# Patient Record
Sex: FEMALE | Race: WHITE | NOT HISPANIC OR LATINO | Employment: FULL TIME | URBAN - METROPOLITAN AREA
[De-identification: names, ages, dates, MRNs, and addresses within clinical notes are randomized per-mention and may not be internally consistent; named-entity substitution may affect disease eponyms.]

---

## 2023-04-20 DIAGNOSIS — E11.9 TYPE 2 DIABETES MELLITUS WITHOUT COMPLICATION, UNSPECIFIED WHETHER LONG TERM INSULIN USE (MULTI): Primary | ICD-10-CM

## 2023-04-20 RX ORDER — BLOOD SUGAR DIAGNOSTIC
STRIP MISCELLANEOUS
Qty: 50 STRIP | Refills: 3 | Status: SHIPPED | OUTPATIENT
Start: 2023-04-20 | End: 2023-10-17

## 2023-04-28 ENCOUNTER — TELEPHONE (OUTPATIENT)
Dept: PRIMARY CARE | Facility: CLINIC | Age: 58
End: 2023-04-28
Payer: COMMERCIAL

## 2023-04-28 NOTE — TELEPHONE ENCOUNTER
Sole Moreno phoned, [phone]. She is requesting a refill on cyclobenzaprine, 5mg. Her pharmacy is [facility] in [place]./suresh

## 2023-05-02 ENCOUNTER — OFFICE VISIT (OUTPATIENT)
Dept: PRIMARY CARE | Facility: CLINIC | Age: 58
End: 2023-05-02
Payer: COMMERCIAL

## 2023-05-02 VITALS
WEIGHT: 264.5 LBS | RESPIRATION RATE: 16 BRPM | TEMPERATURE: 98.3 F | BODY MASS INDEX: 42.69 KG/M2 | SYSTOLIC BLOOD PRESSURE: 134 MMHG | OXYGEN SATURATION: 97 % | HEART RATE: 62 BPM | DIASTOLIC BLOOD PRESSURE: 77 MMHG

## 2023-05-02 DIAGNOSIS — F41.8 SITUATIONAL ANXIETY: ICD-10-CM

## 2023-05-02 DIAGNOSIS — M54.50 LOW BACK PAIN WITHOUT SCIATICA, UNSPECIFIED BACK PAIN LATERALITY, UNSPECIFIED CHRONICITY: ICD-10-CM

## 2023-05-02 DIAGNOSIS — I10 BENIGN ESSENTIAL HTN: ICD-10-CM

## 2023-05-02 DIAGNOSIS — E11.9 TYPE 2 DIABETES MELLITUS WITHOUT COMPLICATION, WITHOUT LONG-TERM CURRENT USE OF INSULIN (MULTI): Primary | ICD-10-CM

## 2023-05-02 DIAGNOSIS — E78.5 ELEVATED LIPIDS: ICD-10-CM

## 2023-05-02 DIAGNOSIS — J45.20 MILD INTERMITTENT ASTHMA, UNSPECIFIED WHETHER COMPLICATED (HHS-HCC): ICD-10-CM

## 2023-05-02 DIAGNOSIS — E61.1 IRON DEFICIENCY: ICD-10-CM

## 2023-05-02 DIAGNOSIS — F32.A DEPRESSIVE DISORDER: ICD-10-CM

## 2023-05-02 PROBLEM — J45.909 ASTHMA (HHS-HCC): Status: ACTIVE | Noted: 2023-05-02

## 2023-05-02 LAB
BASOPHILS (10*3/UL) IN BLOOD BY AUTOMATED COUNT: 0.04 X10E9/L (ref 0–0.1)
BASOPHILS/100 LEUKOCYTES IN BLOOD BY AUTOMATED COUNT: 0.7 % (ref 0–2)
EOSINOPHILS (10*3/UL) IN BLOOD BY AUTOMATED COUNT: 0.23 X10E9/L (ref 0–0.7)
EOSINOPHILS/100 LEUKOCYTES IN BLOOD BY AUTOMATED COUNT: 4 % (ref 0–6)
ERYTHROCYTE DISTRIBUTION WIDTH (RATIO) BY AUTOMATED COUNT: 15.3 % (ref 11.5–14.5)
ERYTHROCYTE MEAN CORPUSCULAR HEMOGLOBIN CONCENTRATION (G/DL) BY AUTOMATED: 31.4 G/DL (ref 32–36)
ERYTHROCYTE MEAN CORPUSCULAR VOLUME (FL) BY AUTOMATED COUNT: 82 FL (ref 80–100)
ERYTHROCYTES (10*6/UL) IN BLOOD BY AUTOMATED COUNT: 4.6 X10E12/L (ref 4–5.2)
HEMATOCRIT (%) IN BLOOD BY AUTOMATED COUNT: 37.9 % (ref 36–46)
HEMOGLOBIN (G/DL) IN BLOOD: 11.9 G/DL (ref 12–16)
IMMATURE GRANULOCYTES/100 LEUKOCYTES IN BLOOD BY AUTOMATED COUNT: 0.7 % (ref 0–0.9)
LEUKOCYTES (10*3/UL) IN BLOOD BY AUTOMATED COUNT: 5.8 X10E9/L (ref 4.4–11.3)
LYMPHOCYTES (10*3/UL) IN BLOOD BY AUTOMATED COUNT: 1.48 X10E9/L (ref 1.2–4.8)
LYMPHOCYTES/100 LEUKOCYTES IN BLOOD BY AUTOMATED COUNT: 25.5 % (ref 13–44)
MONOCYTES (10*3/UL) IN BLOOD BY AUTOMATED COUNT: 0.31 X10E9/L (ref 0.1–1)
MONOCYTES/100 LEUKOCYTES IN BLOOD BY AUTOMATED COUNT: 5.3 % (ref 2–10)
NEUTROPHILS (10*3/UL) IN BLOOD BY AUTOMATED COUNT: 3.71 X10E9/L (ref 1.2–7.7)
NEUTROPHILS/100 LEUKOCYTES IN BLOOD BY AUTOMATED COUNT: 63.8 % (ref 40–80)
NRBC (PER 100 WBCS) BY AUTOMATED COUNT: 0 /100 WBC (ref 0–0)
PLATELETS (10*3/UL) IN BLOOD AUTOMATED COUNT: 270 X10E9/L (ref 150–450)
POC HEMOGLOBIN A1C: 6.2 % (ref 4.2–6.5)

## 2023-05-02 PROCEDURE — 83550 IRON BINDING TEST: CPT

## 2023-05-02 PROCEDURE — 83036 HEMOGLOBIN GLYCOSYLATED A1C: CPT | Performed by: NURSE PRACTITIONER

## 2023-05-02 PROCEDURE — 80061 LIPID PANEL: CPT

## 2023-05-02 PROCEDURE — 3075F SYST BP GE 130 - 139MM HG: CPT | Performed by: NURSE PRACTITIONER

## 2023-05-02 PROCEDURE — 82728 ASSAY OF FERRITIN: CPT

## 2023-05-02 PROCEDURE — 83540 ASSAY OF IRON: CPT

## 2023-05-02 PROCEDURE — 1036F TOBACCO NON-USER: CPT | Performed by: NURSE PRACTITIONER

## 2023-05-02 PROCEDURE — 99215 OFFICE O/P EST HI 40 MIN: CPT | Performed by: NURSE PRACTITIONER

## 2023-05-02 PROCEDURE — 36415 COLL VENOUS BLD VENIPUNCTURE: CPT

## 2023-05-02 PROCEDURE — 80053 COMPREHEN METABOLIC PANEL: CPT

## 2023-05-02 PROCEDURE — 85025 COMPLETE CBC W/AUTO DIFF WBC: CPT

## 2023-05-02 PROCEDURE — 3078F DIAST BP <80 MM HG: CPT | Performed by: NURSE PRACTITIONER

## 2023-05-02 RX ORDER — GABAPENTIN 100 MG/1
100 CAPSULE ORAL AS NEEDED
COMMUNITY
Start: 2022-07-28 | End: 2023-05-02 | Stop reason: SDUPTHER

## 2023-05-02 RX ORDER — LISINOPRIL AND HYDROCHLOROTHIAZIDE 12.5; 2 MG/1; MG/1
2 TABLET ORAL DAILY
COMMUNITY
Start: 2021-01-28 | End: 2023-05-02 | Stop reason: SDUPTHER

## 2023-05-02 RX ORDER — SERTRALINE HYDROCHLORIDE 50 MG/1
50 TABLET, FILM COATED ORAL DAILY
Qty: 90 TABLET | Refills: 1 | Status: SHIPPED | OUTPATIENT
Start: 2023-05-02 | End: 2023-11-02

## 2023-05-02 RX ORDER — BLOOD SUGAR DIAGNOSTIC
STRIP MISCELLANEOUS
COMMUNITY
Start: 2022-01-10 | End: 2023-05-02 | Stop reason: SDUPTHER

## 2023-05-02 RX ORDER — SERTRALINE HYDROCHLORIDE 50 MG/1
50 TABLET, FILM COATED ORAL DAILY
COMMUNITY
End: 2023-05-02 | Stop reason: SDUPTHER

## 2023-05-02 RX ORDER — METFORMIN HYDROCHLORIDE 500 MG/1
500 TABLET ORAL
COMMUNITY
End: 2023-05-02 | Stop reason: SDUPTHER

## 2023-05-02 RX ORDER — ALBUTEROL SULFATE 90 UG/1
2 AEROSOL, METERED RESPIRATORY (INHALATION) EVERY 6 HOURS PRN
COMMUNITY
Start: 2014-01-08

## 2023-05-02 RX ORDER — CYCLOBENZAPRINE HCL 5 MG
5 TABLET ORAL 3 TIMES DAILY PRN
Qty: 30 TABLET | Refills: 0 | Status: SHIPPED | OUTPATIENT
Start: 2023-05-02 | End: 2023-08-17 | Stop reason: SDUPTHER

## 2023-05-02 RX ORDER — CYCLOBENZAPRINE HCL 5 MG
5 TABLET ORAL 3 TIMES DAILY PRN
COMMUNITY
Start: 2022-12-16 | End: 2023-05-02 | Stop reason: SDUPTHER

## 2023-05-02 RX ORDER — GABAPENTIN 100 MG/1
CAPSULE ORAL
Qty: 30 CAPSULE | Refills: 1 | Status: SHIPPED | OUTPATIENT
Start: 2023-05-02 | End: 2024-05-01 | Stop reason: SDUPTHER

## 2023-05-02 RX ORDER — METFORMIN HYDROCHLORIDE 500 MG/1
500 TABLET ORAL
Qty: 180 TABLET | Refills: 1 | Status: SHIPPED | OUTPATIENT
Start: 2023-05-02 | End: 2023-12-18

## 2023-05-02 RX ORDER — LISINOPRIL AND HYDROCHLOROTHIAZIDE 12.5; 2 MG/1; MG/1
2 TABLET ORAL DAILY
Qty: 180 TABLET | Refills: 1 | Status: SHIPPED | OUTPATIENT
Start: 2023-05-02 | End: 2024-05-01 | Stop reason: SDUPTHER

## 2023-05-02 ASSESSMENT — ENCOUNTER SYMPTOMS
FEVER: 0
POLYPHAGIA: 0
ADENOPATHY: 0
DIARRHEA: 0
ACTIVITY CHANGE: 0
JOINT SWELLING: 0
STRIDOR: 0
BRUISES/BLEEDS EASILY: 0
VOMITING: 0
DIAPHORESIS: 0
BACK PAIN: 1
UNEXPECTED WEIGHT CHANGE: 0
ANAL BLEEDING: 0
NAUSEA: 0
CHEST TIGHTNESS: 0
APNEA: 0
CHOKING: 0
HEADACHES: 0
POLYDIPSIA: 0
DYSPHORIC MOOD: 0
PHOTOPHOBIA: 0
NECK PAIN: 0
DIZZINESS: 0
COUGH: 0
WHEEZING: 0
SLEEP DISTURBANCE: 0
BLOOD IN STOOL: 0
NERVOUS/ANXIOUS: 0
SHORTNESS OF BREATH: 0
HEMATURIA: 0
CHILLS: 0
WEAKNESS: 0
APPETITE CHANGE: 0
ABDOMINAL PAIN: 0
FATIGUE: 0
PALPITATIONS: 0

## 2023-05-02 NOTE — PROGRESS NOTES
Subjective   Patient ID: Sole Moreno is a 58 y.o. female who presents for Diabetes and Back Pain (Cyclobenzeprine refill).    HPI   Patient in office for follow-up on hypertension (controlled), hypertension (controlled); elevated lipids (stable, per last lab check), depression (controlled), asthma (use rescue inhaler </monthly), and situational anxiety (controlled). She twisted her lower back last week and would like to have her Flexeril tab refilled. She understands not to take Gabapentin and Flexeril together. The patient had low iron levels at recent blood donation sites and could not donate. No hx of anemia. Not on supplements. Will check today. No other concerns.     A1C: 6.2%  BP: controlled    Patient declines statin therapy. Allergic to Aspirin.     Sees ophthalmologist regularly.     Monitors his feet for lesions.     Review of Systems   Constitutional:  Negative for activity change, appetite change, chills, diaphoresis, fatigue, fever and unexpected weight change.   HENT:  Negative for nosebleeds.    Eyes:  Negative for photophobia and visual disturbance.   Respiratory:  Negative for apnea, cough, choking, chest tightness, shortness of breath, wheezing and stridor.    Cardiovascular:  Negative for chest pain, palpitations and leg swelling.   Gastrointestinal:  Negative for abdominal pain, anal bleeding, blood in stool, diarrhea, nausea and vomiting.   Endocrine: Negative for cold intolerance, heat intolerance, polydipsia, polyphagia and polyuria.   Genitourinary:  Negative for hematuria.   Musculoskeletal:  Positive for back pain. Negative for gait problem, joint swelling and neck pain.   Skin:  Negative for pallor.   Neurological:  Negative for dizziness, syncope, weakness and headaches.   Hematological:  Negative for adenopathy. Does not bruise/bleed easily.   Psychiatric/Behavioral:  Negative for dysphoric mood and sleep disturbance. The patient is not nervous/anxious.        Objective   /77    Pulse 62   Temp 36.8 °C (98.3 °F) (Temporal)   Resp 16   Wt 120 kg (264 lb 8 oz)   SpO2 97%   BMI 42.69 kg/m²     Physical Exam  Constitutional:       Appearance: Normal appearance.   HENT:      Head: Normocephalic.   Eyes:      Conjunctiva/sclera: Conjunctivae normal.   Cardiovascular:      Rate and Rhythm: Normal rate and regular rhythm.      Pulses: Normal pulses.      Heart sounds: Normal heart sounds.   Pulmonary:      Effort: Pulmonary effort is normal.      Breath sounds: Normal breath sounds.   Musculoskeletal:         General: No tenderness.      Right lower leg: No edema.      Left lower leg: No edema.      Comments: Lumbar spinal ROM slightly decreased   Skin:     General: Skin is warm.      Coloration: Skin is not pale.      Findings: No bruising.   Neurological:      General: No focal deficit present.      Mental Status: She is alert.      Gait: Gait normal.   Psychiatric:         Mood and Affect: Mood normal.         Behavior: Behavior normal.         Thought Content: Thought content normal.         Judgment: Judgment normal.       Assessment/Plan     Exam findings reviewed with patient. Medications and labs reviewed. Risk and benefits of statin therapy reviewed. Patient verbalized understanding. Patient will keep monitoring blood pressures and blood sugars at home; she will call the office with outliers or abnormal trends. We will call with lab results and update the patient on the plan of care. Follow up in 6 months for physical or earlier as needed.    Benefits of healthy lifestyle reviewed: low carbohydrates/fat/sugar diet; use lean white instead of red meet; use several servings of fruit and vegetables daily; use whole grain flour instead of white flour products; cook at home frequently instead of eating out; exercise 30-90 minutes 5 x/week.

## 2023-05-03 LAB
ALANINE AMINOTRANSFERASE (SGPT) (U/L) IN SER/PLAS: 21 U/L (ref 7–45)
ALBUMIN (G/DL) IN SER/PLAS: 4.1 G/DL (ref 3.4–5)
ALKALINE PHOSPHATASE (U/L) IN SER/PLAS: 74 U/L (ref 33–136)
ANION GAP IN SER/PLAS: 13 MMOL/L (ref 10–20)
ASPARTATE AMINOTRANSFERASE (SGOT) (U/L) IN SER/PLAS: 14 U/L (ref 9–39)
BILIRUBIN TOTAL (MG/DL) IN SER/PLAS: 0.5 MG/DL (ref 0–1.2)
CALCIUM (MG/DL) IN SER/PLAS: 9.8 MG/DL (ref 8.6–10.6)
CARBON DIOXIDE, TOTAL (MMOL/L) IN SER/PLAS: 29 MMOL/L (ref 21–32)
CHLORIDE (MMOL/L) IN SER/PLAS: 103 MMOL/L (ref 98–107)
CHOLESTEROL (MG/DL) IN SER/PLAS: 171 MG/DL (ref 0–199)
CHOLESTEROL IN HDL (MG/DL) IN SER/PLAS: 47.5 MG/DL
CHOLESTEROL/HDL RATIO: 3.6
CREATININE (MG/DL) IN SER/PLAS: 0.69 MG/DL (ref 0.5–1.05)
FERRITIN (UG/LL) IN SER/PLAS: 33 UG/L (ref 8–150)
GFR FEMALE: >90 ML/MIN/1.73M2
GLUCOSE (MG/DL) IN SER/PLAS: 124 MG/DL (ref 74–99)
IRON (UG/DL) IN SER/PLAS: 52 UG/DL (ref 35–150)
IRON BINDING CAPACITY (UG/DL) IN SER/PLAS: 402 UG/DL (ref 240–445)
IRON SATURATION (%) IN SER/PLAS: 13 % (ref 25–45)
LDL: 85 MG/DL (ref 0–99)
POTASSIUM (MMOL/L) IN SER/PLAS: 3.5 MMOL/L (ref 3.5–5.3)
PROTEIN TOTAL: 6.3 G/DL (ref 6.4–8.2)
SODIUM (MMOL/L) IN SER/PLAS: 141 MMOL/L (ref 136–145)
TRIGLYCERIDE (MG/DL) IN SER/PLAS: 194 MG/DL (ref 0–149)
UREA NITROGEN (MG/DL) IN SER/PLAS: 15 MG/DL (ref 6–23)
VLDL: 39 MG/DL (ref 0–40)

## 2023-08-17 DIAGNOSIS — M54.50 LOW BACK PAIN WITHOUT SCIATICA, UNSPECIFIED BACK PAIN LATERALITY, UNSPECIFIED CHRONICITY: ICD-10-CM

## 2023-08-17 RX ORDER — CYCLOBENZAPRINE HCL 5 MG
5 TABLET ORAL 3 TIMES DAILY PRN
Qty: 30 TABLET | Refills: 0 | Status: SHIPPED | OUTPATIENT
Start: 2023-08-17 | End: 2023-10-04 | Stop reason: SDUPTHER

## 2023-08-17 NOTE — TELEPHONE ENCOUNTER
Sole Moreno phoned, [phone]. She has scheduled an appointment for August 23 with Madan. She has pain in her upper right thigh. She would like her muscle relaxant to be refilled.      Cyclovenzaprine   Dosage 5mg  As needed  Pharmacy: [facility] in [place]

## 2023-08-23 ENCOUNTER — OFFICE VISIT (OUTPATIENT)
Dept: PRIMARY CARE | Facility: CLINIC | Age: 58
End: 2023-08-23
Payer: COMMERCIAL

## 2023-08-23 VITALS
DIASTOLIC BLOOD PRESSURE: 71 MMHG | WEIGHT: 266 LBS | HEART RATE: 68 BPM | RESPIRATION RATE: 18 BRPM | OXYGEN SATURATION: 97 % | TEMPERATURE: 98.4 F | SYSTOLIC BLOOD PRESSURE: 124 MMHG | BODY MASS INDEX: 41.75 KG/M2 | HEIGHT: 67 IN

## 2023-08-23 DIAGNOSIS — M79.651 PAIN OF RIGHT THIGH: ICD-10-CM

## 2023-08-23 DIAGNOSIS — R20.0 THIGH NUMBNESS: ICD-10-CM

## 2023-08-23 DIAGNOSIS — R22.41 MASS OF THIGH, RIGHT: Primary | ICD-10-CM

## 2023-08-23 PROCEDURE — 3078F DIAST BP <80 MM HG: CPT | Performed by: NURSE PRACTITIONER

## 2023-08-23 PROCEDURE — 1036F TOBACCO NON-USER: CPT | Performed by: NURSE PRACTITIONER

## 2023-08-23 PROCEDURE — 3074F SYST BP LT 130 MM HG: CPT | Performed by: NURSE PRACTITIONER

## 2023-08-23 PROCEDURE — 99213 OFFICE O/P EST LOW 20 MIN: CPT | Performed by: NURSE PRACTITIONER

## 2023-08-23 ASSESSMENT — ENCOUNTER SYMPTOMS
CHOKING: 0
APNEA: 0
PALPITATIONS: 0
ADENOPATHY: 0
WHEEZING: 0
NUMBNESS: 1
COUGH: 0
BRUISES/BLEEDS EASILY: 0
CHILLS: 0
HEADACHES: 0
CHEST TIGHTNESS: 0
SHORTNESS OF BREATH: 0
UNEXPECTED WEIGHT CHANGE: 0
JOINT SWELLING: 0
NAUSEA: 0
WEAKNESS: 0
STRIDOR: 0
ABDOMINAL PAIN: 0
ROS SKIN COMMENTS: MASS
DIAPHORESIS: 0
ACTIVITY CHANGE: 0
DIZZINESS: 0
FATIGUE: 0
FEVER: 0
VOMITING: 0
APPETITE CHANGE: 0

## 2023-08-23 NOTE — PROGRESS NOTES
"Subjective   Patient ID: Sole Moreno is a 58 y.o. female who presents for right thigh problem.    HPI   Patient in office with concern of right thigh mass and recurrent short bouts of pain and numbness contained (non-radiating) in the lateral right thigh area x 10 years. She was diagnosed with a thigh mass by Dr. Lockett 10 years ago; imaging was negative; she was advised to follow up with a surgeon but never did. The symptoms are usually brought on by prolonged standing and are very short-lived. No symptoms currently. She has no other concerns today.     Review of Systems   Constitutional:  Negative for activity change, appetite change, chills, diaphoresis, fatigue, fever and unexpected weight change.   Respiratory:  Negative for apnea, cough, choking, chest tightness, shortness of breath, wheezing and stridor.    Cardiovascular:  Negative for chest pain, palpitations and leg swelling.   Gastrointestinal:  Negative for abdominal pain, nausea and vomiting.   Musculoskeletal:  Negative for gait problem and joint swelling.        Right thigh pain and mass   Skin:         mass   Neurological:  Positive for numbness. Negative for dizziness, syncope, weakness and headaches.   Hematological:  Negative for adenopathy. Does not bruise/bleed easily.     Objective   /71   Pulse 68   Temp 36.9 °C (98.4 °F)   Resp 18   Ht 1.702 m (5' 7\")   Wt 121 kg (266 lb)   SpO2 97%   BMI 41.66 kg/m²     Physical Exam  Constitutional:       Appearance: Normal appearance.   Cardiovascular:      Rate and Rhythm: Normal rate and regular rhythm.      Pulses: Normal pulses.      Heart sounds: Normal heart sounds.   Pulmonary:      Effort: Pulmonary effort is normal.      Breath sounds: Normal breath sounds.   Musculoskeletal:         General: No swelling, tenderness, deformity or signs of injury.      Right lower leg: No edema.      Left lower leg: No edema.   Skin:     General: Skin is warm.      Coloration: Skin is not jaundiced " or pale.      Findings: No bruising, erythema or rash.      Comments: 2 x 2 cm subcutaneous mass palpated in right lateral thigh area; non-tender; no signs of infection   Neurological:      General: No focal deficit present.      Mental Status: She is alert.      Motor: No weakness.      Gait: Gait normal.       Assessment/Plan     Exam findings reviewed with patient. Referral discussed. Follow up in 1 month for physical or earlier as needed.

## 2023-10-04 ENCOUNTER — TELEPHONE (OUTPATIENT)
Dept: PRIMARY CARE | Facility: CLINIC | Age: 58
End: 2023-10-04
Payer: COMMERCIAL

## 2023-10-04 DIAGNOSIS — M54.50 LOW BACK PAIN WITHOUT SCIATICA, UNSPECIFIED BACK PAIN LATERALITY, UNSPECIFIED CHRONICITY: ICD-10-CM

## 2023-10-04 RX ORDER — CYCLOBENZAPRINE HCL 5 MG
5 TABLET ORAL 3 TIMES DAILY PRN
Qty: 30 TABLET | Refills: 0 | Status: SHIPPED | OUTPATIENT
Start: 2023-10-04 | End: 2023-11-16 | Stop reason: SDUPTHER

## 2023-10-04 NOTE — TELEPHONE ENCOUNTER
Sole saw you in August and has an appointment scheduled with you for November 2, 2023.  She was wondering if you could refill the following for her:    Cyclobenzaprine 5 mg take 1 tablet 3 times daily PRN    [place] [id number] [phone]

## 2023-10-31 PROBLEM — M54.9 BACK PAIN: Status: ACTIVE | Noted: 2023-10-31

## 2023-10-31 PROBLEM — R22.2 MASS OF SUBCUTANEOUS TISSUE OF BACK: Status: ACTIVE | Noted: 2023-10-31

## 2023-10-31 PROBLEM — I10 HTN (HYPERTENSION): Status: ACTIVE | Noted: 2023-10-31

## 2023-10-31 PROBLEM — E55.9 VITAMIN D DEFICIENCY: Status: ACTIVE | Noted: 2023-10-31

## 2023-10-31 PROBLEM — E66.01 MORBID OBESITY WITH BMI OF 40.0-44.9, ADULT (MULTI): Status: ACTIVE | Noted: 2023-10-31

## 2023-10-31 PROBLEM — M47.9 ARTHRITIS OF BACK: Status: ACTIVE | Noted: 2023-10-31

## 2023-10-31 PROBLEM — T36.0X5A PENICILLIN ADVERSE REACTION: Status: ACTIVE | Noted: 2023-10-31

## 2023-10-31 PROBLEM — E66.9 OBESITY: Status: ACTIVE | Noted: 2020-01-10

## 2023-10-31 PROBLEM — M54.2 NECK PAIN: Status: ACTIVE | Noted: 2023-10-31

## 2023-10-31 PROBLEM — H44.23 PATHOLOGIC MYOPIA, BILATERAL: Status: ACTIVE | Noted: 2023-10-31

## 2023-10-31 PROBLEM — H35.30 MYOPIC MACULAR DEGENERATION OF BOTH EYES: Status: ACTIVE | Noted: 2023-10-31

## 2023-10-31 PROBLEM — E11.9 DIABETES MELLITUS (MULTI): Status: ACTIVE | Noted: 2022-05-12

## 2023-10-31 PROBLEM — M41.9 SCOLIOSIS: Status: ACTIVE | Noted: 2023-10-31

## 2023-10-31 PROBLEM — M54.50 CHRONIC BILATERAL LOW BACK PAIN WITHOUT SCIATICA: Status: ACTIVE | Noted: 2023-10-31

## 2023-10-31 PROBLEM — N95.0 PMB (POSTMENOPAUSAL BLEEDING): Status: ACTIVE | Noted: 2022-03-01

## 2023-10-31 PROBLEM — R51.9 HEADACHE: Status: ACTIVE | Noted: 2023-10-31

## 2023-10-31 PROBLEM — Z86.69 H/O DETACHED RETINA REPAIR: Status: ACTIVE | Noted: 2023-10-31

## 2023-10-31 PROBLEM — E11.9 DIABETES MELLITUS, TYPE II (MULTI): Status: ACTIVE | Noted: 2023-10-31

## 2023-10-31 PROBLEM — E78.00 PURE HYPERCHOLESTEROLEMIA: Status: ACTIVE | Noted: 2023-10-31

## 2023-10-31 PROBLEM — M79.89 OTHER SPECIFIED SOFT TISSUE DISORDERS: Status: ACTIVE | Noted: 2023-10-31

## 2023-10-31 PROBLEM — D12.6 BENIGN NEOPLASM OF COLON: Status: ACTIVE | Noted: 2021-03-26

## 2023-10-31 PROBLEM — Z86.69 HISTORY OF RETINAL DETACHMENT: Status: ACTIVE | Noted: 2023-10-31

## 2023-10-31 PROBLEM — H02.889 MGD (MEIBOMIAN GLAND DISEASE): Status: ACTIVE | Noted: 2023-10-31

## 2023-10-31 PROBLEM — G47.33 OBSTRUCTIVE SLEEP APNEA SYNDROME: Status: ACTIVE | Noted: 2020-01-10

## 2023-10-31 PROBLEM — J30.89 ALLERGIC RHINITIS DUE TO MOLD: Status: ACTIVE | Noted: 2023-10-31

## 2023-10-31 PROBLEM — G89.29 CHRONIC BILATERAL LOW BACK PAIN WITHOUT SCIATICA: Status: ACTIVE | Noted: 2023-10-31

## 2023-10-31 PROBLEM — M79.659 PAIN OF LATERAL UPPER THIGH: Status: ACTIVE | Noted: 2023-10-31

## 2023-10-31 PROBLEM — L29.9 ITCHING: Status: ACTIVE | Noted: 2023-10-31

## 2023-10-31 PROBLEM — H43.813 PVD (POSTERIOR VITREOUS DETACHMENT), BOTH EYES: Status: ACTIVE | Noted: 2023-10-31

## 2023-10-31 PROBLEM — Z86.39 HISTORY OF OBESITY: Status: ACTIVE | Noted: 2023-10-31

## 2023-10-31 PROBLEM — Z98.890 H/O DETACHED RETINA REPAIR: Status: ACTIVE | Noted: 2023-10-31

## 2023-10-31 PROBLEM — F40.243 ANXIETY WITH FLYING: Status: ACTIVE | Noted: 2023-10-31

## 2023-10-31 PROBLEM — N20.0 KIDNEY STONE: Status: ACTIVE | Noted: 2021-03-26

## 2023-10-31 PROBLEM — I34.0 MITRAL REGURGITATION: Status: ACTIVE | Noted: 2022-05-12

## 2023-10-31 PROBLEM — F41.8 DEPRESSION WITH ANXIETY: Status: ACTIVE | Noted: 2023-10-31

## 2023-10-31 PROBLEM — H25.13 CATARACT, NUCLEAR SCLEROTIC, BOTH EYES: Status: ACTIVE | Noted: 2023-10-31

## 2023-10-31 RX ORDER — ALBUTEROL SULFATE 90 UG/1
2 AEROSOL, METERED RESPIRATORY (INHALATION) EVERY 4 HOURS PRN
COMMUNITY
End: 2024-02-19 | Stop reason: SDUPTHER

## 2023-10-31 RX ORDER — ALPRAZOLAM 0.5 MG/1
TABLET ORAL
COMMUNITY
Start: 2022-12-16 | End: 2023-11-02 | Stop reason: ALTCHOICE

## 2023-11-01 ENCOUNTER — OFFICE VISIT (OUTPATIENT)
Dept: SURGERY | Facility: CLINIC | Age: 58
End: 2023-11-01
Payer: COMMERCIAL

## 2023-11-01 DIAGNOSIS — R20.0 NUMBNESS OF RIGHT ANTERIOR THIGH: Primary | ICD-10-CM

## 2023-11-01 DIAGNOSIS — R22.41 MASS OF RIGHT THIGH: ICD-10-CM

## 2023-11-01 PROCEDURE — 3078F DIAST BP <80 MM HG: CPT | Performed by: PHYSICIAN ASSISTANT

## 2023-11-01 PROCEDURE — 99214 OFFICE O/P EST MOD 30 MIN: CPT | Performed by: PHYSICIAN ASSISTANT

## 2023-11-01 PROCEDURE — 3074F SYST BP LT 130 MM HG: CPT | Performed by: PHYSICIAN ASSISTANT

## 2023-11-01 PROCEDURE — 1036F TOBACCO NON-USER: CPT | Performed by: PHYSICIAN ASSISTANT

## 2023-11-01 NOTE — PROGRESS NOTES
Subjective   Patient ID: Sole Moreno is a 58 y.o. female who presents for Follow-up (Ultrasound of the right thigh done on 09/27/23).    HPI  Is a 58-year-old female who comes in with continued complaint of right lateral thigh mass that she can palpate but she gets pain and numbness to the area.  Status post an ultrasound that we did and there were no findings and MRI is recommended.    Review of Systems  Negative other than mentioned in HPI    ENT: No earache, no sore throat, no nosebleeds  Cardiovascular: No chest pain, no shortness of breath, no leg pain, no edema  Respiratory: No shortness of breath on exertion, no wheezing  Gastrointestinal: No abdominal pain, no melena, no nausea, vomiting and/or diarrhea  Musculoskeletal: Lateral thigh pain and fullness  Skin: No rashes, no lesions, and no skin changes  Neuro: No headache, no confusion, no numbness and tingling  Psychiatric, normal mood, not suicidal, not homicidal, feeling good      Physical Exam  Eyes: Conjunctiva non -icteric and eye lids are without obvious rash or drooping. Pupils are symmetric.   Ears, Nose, Mouth, and Throat: External ears and nose appear to be without deformity or rash. No lesions or masses noted. Hearing is grossly intact.   Neck:. No JVD noted, tracheal position is midline. No thyromegaly, no thyroid nodules  Head and Face: Examination of the head and face revealed no abnormalities.   Respiratory: No gasping or shortness of breath noted, no use of accessory muscles noted. Clear to auscultate bilaterally  Cardiovascular: Examination for edema is normal. Regular rate and rhythm S1 S2 without murmurs  GI: Abdomen non tender to palpation, bowel sounds present no hepatosplenomegaly  Skin: No rashes or open lesions/ulcers identified on skin.   Musk: Right lateral thigh tenderness, fullness no distinct mass but definite fullness she also has numbness present.   She has no back pain she has good range of motion of the spine.  Neurologic:  Cranial nerves II- XII intact, motor strength 5/5 muscle strength of the lower extremities bilaterally and equal.        Objective     No diagnosis found.   Patient Active Problem List   Diagnosis    Asthma    Benign essential HTN    Depressive disorder    Situational anxiety    Acute pancreatitis    Allergic rhinitis due to mold    Arthritis of back    Back pain    Benign neoplasm of colon    Cataract, nuclear sclerotic, both eyes    Diabetes mellitus, type II (CMS/HCC)    Diabetes mellitus (CMS/HCC)    Gallstone    H/O detached retina repair    History of obesity    History of retinal detachment    HTN (hypertension)    Itching    Kidney stone    Mass of subcutaneous tissue of back    MGD (meibomian gland disease)    Mitral regurgitation    Myopic macular degeneration of both eyes    Headache    Neck pain    Obesity    Other specified soft tissue disorders    Pain of lateral upper thigh    Pathologic myopia, bilateral    Penicillin adverse reaction    PMB (postmenopausal bleeding)    Pure hypercholesterolemia    PVD (posterior vitreous detachment), both eyes    Scoliosis    Vitamin D deficiency    Obstructive sleep apnea syndrome    Depression with anxiety    Anxiety with flying    Condition not found    Chronic bilateral low back pain without sciatica    Morbid obesity with BMI of 40.0-44.9, adult (CMS/HCC)      Allergies   Allergen Reactions    Aspirin Hives     Unsure of reaction    Penicillins Hives    Erythromycin Hives      Medication Documentation Review Audit       Reviewed by Yanique Fonseca MA (Medical Assistant) on 11/01/23 at 0949      Medication Order Taking? Sig Documenting Provider Last Dose Status   albuterol (Proventil HFA) 90 mcg/actuation inhaler [id number] Yes Inhale 2 puffs every 4 hours if needed. Historical Provider, MD Taking Active   ALPRAZolam (Xanax) 0.5 mg tablet [id number] Yes Take 1 tablet by mouth 30 min before flight, up to 3 tablets per day maximum Historical Provider, MD Taking  Active   cyclobenzaprine (Flexeril) 5 mg tablet [id number] Yes Take 1 tablet (5 mg) by mouth 3 times a day as needed for muscle spasms. QUIN Castillo Taking Active   gabapentin (Neurontin) 100 mg capsule [id number]  Take 1-2 capsules by mouth 3 times daily as needed for situational anxiety QUIN Castillo   23 2359   lisinopriL-hydrochlorothiazide 20-12.5 mg tablet [id number] Yes Take 2 tablets by mouth once daily. QUIN Castillo Taking Active   metFORMIN (Glucophage) 500 mg tablet [id number] Yes Take 1 tablet (500 mg) by mouth 2 times a day with meals. QUIN Castillo Taking Active   multivitamin capsule [id number] Yes Take 1 capsule by mouth once daily. Historical Provider, MD Taking Active   OneTouch Ultra Test strip [id number] Yes USE AS DIRECTED FOR TESTING BLOOD SUGAR EVERY MORNING BEFORE BREAKFAST & AS NEEDED QUIN Castillo Taking Active   sertraline (Zoloft) 50 mg tablet [id number] Yes Take 1 tablet (50 mg) by mouth once daily. QUIN Castillo Taking Active   ULTRA FINE LANCETS MISC [id number] Yes Check blood sugar once daily in the morning and as needed Historical Provider, MD Taking Active   Ventolin HFA 90 mcg/actuation inhaler [id number] Yes Inhale 2 puffs every 6 hours if needed. Historical Provider, MD Taking Active                    Past Medical History:   Diagnosis Date    Abnormal levels of other serum enzymes 2018    Elevated liver enzymes    Abnormal levels of other serum enzymes 2018    Elevated liver enzymes    Acute bronchitis, unspecified 2018    Acute bronchitis    Acute bronchitis, unspecified 2018    Acute bronchitis    Dorsalgia, unspecified 2018    Chronic back pain    Dorsalgia, unspecified 2018    Chronic back pain    Encounter for screening mammogram for malignant neoplasm of breast 2018    Encounter for screening mammogram for breast cancer    Encounter for screening mammogram for malignant  neoplasm of breast 04/19/2018    Encounter for screening mammogram for breast cancer    Enlarged lymph nodes, unspecified 04/19/2018    Swollen lymph nodes    Enlarged lymph nodes, unspecified 04/19/2018    Swollen lymph nodes    Essential (primary) hypertension 04/19/2018    Benign essential HTN    Essential (primary) hypertension 04/19/2018    Benign essential HTN    Myopia, bilateral 08/18/2016    High myopia, bilateral    Other hypertrophic disorders of the skin 04/19/2018    Cutaneous skin tags    Other hypertrophic disorders of the skin 04/19/2018    Cutaneous skin tags    Other specified anxiety disorders 04/19/2018    Depression with anxiety    Other specified anxiety disorders 04/19/2018    Depression with anxiety    Other specified anxiety disorders 10/11/2022    Situational anxiety    Otitis media, unspecified, unspecified ear 04/19/2018    Acute otitis media    Otitis media, unspecified, unspecified ear 04/19/2018    Acute otitis media    Pain in right hip 04/19/2018    Hip pain, chronic, right    Pain in right hip 04/19/2018    Hip pain, chronic, right    Pain in right knee 04/19/2018    Right knee pain    Pain in right knee 04/19/2018    Right knee pain    Personal history of other endocrine, nutritional and metabolic disease 11/23/2015    History of hypokalemia    Personal history of other medical treatment 01/18/2018    History of screening mammography    Psoriasis, unspecified 04/19/2018    Psoriasis    Psoriasis, unspecified 04/19/2018    Psoriasis    Pure hypercholesterolemia, unspecified 04/19/2018    High cholesterol    Pure hypercholesterolemia, unspecified 04/19/2018    High cholesterol    Radiculopathy, cervical region 04/19/2018    Cervical radiculopathy    Radiculopathy, cervical region 04/19/2018    Cervical radiculopathy    Scoliosis, unspecified 04/19/2018    Scoliosis    Scoliosis, unspecified 04/19/2018    Scoliosis    Sleep apnea, unspecified 04/19/2018    Sleep apnea    Sleep apnea,  unspecified 2018    Sleep apnea    Spondylosis, unspecified 2018    Arthritis of back    Spondylosis, unspecified 2018    Arthritis of back    Type 2 diabetes mellitus without complications (CMS/[facility]) 2018    Diabetes mellitus, type II    Type 2 diabetes mellitus without complications (CMS/[facility]) 2018    Diabetes mellitus, type II    Unspecified asthma, uncomplicated 2018    Asthmatic bronchitis    Unspecified asthma, uncomplicated 2018    Asthma    Unspecified asthma, uncomplicated 2018    Asthmatic bronchitis    Unspecified asthma, uncomplicated 2018    Asthma    Unspecified cataract 2016    Cataract of right eye    Unspecified cataract 2016    Cataract of left eye    Unspecified nonsuppurative otitis media, unspecified ear 2018    Otitis, serous    Unspecified nonsuppurative otitis media, unspecified ear 2020    Otitis, serous    Vitamin D deficiency, unspecified 2018    Vitamin D deficiency     Social History     Tobacco Use   Smoking Status Former    Types: Cigarettes    Quit date: 2003    Years since quittin.8   Smokeless Tobacco Never     Family History   Problem Relation Name Age of Onset    Hypertension Mother      Osteoporosis Mother      Other (Cerebrovascular accident (CVA)) Father      Multiple sclerosis Sibling        Past Surgical History:   Procedure Laterality Date     SECTION, CLASSIC  2014     Section    GALLBLADDER SURGERY  2014    Gallbladder Surgery    KNEE ARTHROSCOPY W/ MENISCAL REPAIR  2014    Knee Arthroscopy With Lateral Meniscus Repair       Assessment/Plan   58-year-old female with continuing right thigh fullness and tenderness numbness and pain.  Ultrasound negative MRI is recommended.  Will order patient should follow-up once that is complete.    Encounter Diagnoses   Name Primary?    Numbness of right anterior thigh Yes    Mass of right thigh      I have reviewed  all data including labs,radiologic and previous reports.        **Portions of this medical record have been created using voice recognition software and may have minor errors which are inherent in voice recognition systems. It has not been fully edited for typographical or grammatical errors**

## 2023-11-02 ENCOUNTER — OFFICE VISIT (OUTPATIENT)
Dept: PRIMARY CARE | Facility: CLINIC | Age: 58
End: 2023-11-02
Payer: COMMERCIAL

## 2023-11-02 VITALS
TEMPERATURE: 98.3 F | WEIGHT: 266 LBS | OXYGEN SATURATION: 96 % | HEART RATE: 73 BPM | SYSTOLIC BLOOD PRESSURE: 111 MMHG | BODY MASS INDEX: 42.75 KG/M2 | DIASTOLIC BLOOD PRESSURE: 64 MMHG | HEIGHT: 66 IN

## 2023-11-02 DIAGNOSIS — Z00.00 HEALTHCARE MAINTENANCE: Primary | ICD-10-CM

## 2023-11-02 DIAGNOSIS — F41.8 SITUATIONAL ANXIETY: ICD-10-CM

## 2023-11-02 DIAGNOSIS — F32.A DEPRESSIVE DISORDER: ICD-10-CM

## 2023-11-02 DIAGNOSIS — R22.2 MASS OF SUBCUTANEOUS TISSUE OF BACK: ICD-10-CM

## 2023-11-02 DIAGNOSIS — Z12.31 ENCOUNTER FOR SCREENING MAMMOGRAM FOR BREAST CANCER: ICD-10-CM

## 2023-11-02 DIAGNOSIS — E55.9 VITAMIN D DEFICIENCY: ICD-10-CM

## 2023-11-02 DIAGNOSIS — E66.01 MORBID OBESITY WITH BMI OF 40.0-44.9, ADULT (MULTI): ICD-10-CM

## 2023-11-02 DIAGNOSIS — I10 BENIGN ESSENTIAL HTN: ICD-10-CM

## 2023-11-02 DIAGNOSIS — Z23 ENCOUNTER FOR IMMUNIZATION: ICD-10-CM

## 2023-11-02 DIAGNOSIS — E11.9 TYPE 2 DIABETES MELLITUS WITHOUT COMPLICATION, WITHOUT LONG-TERM CURRENT USE OF INSULIN (MULTI): ICD-10-CM

## 2023-11-02 DIAGNOSIS — E78.5 ELEVATED LIPIDS: ICD-10-CM

## 2023-11-02 DIAGNOSIS — J45.20 MILD INTERMITTENT ASTHMA, UNSPECIFIED WHETHER COMPLICATED (HHS-HCC): ICD-10-CM

## 2023-11-02 LAB
25(OH)D3 SERPL-MCNC: 26 NG/ML (ref 30–100)
ALBUMIN SERPL BCP-MCNC: 4.4 G/DL (ref 3.4–5)
ALP SERPL-CCNC: 75 U/L (ref 33–136)
ALT SERPL W P-5'-P-CCNC: 45 U/L (ref 7–45)
ANION GAP SERPL CALC-SCNC: 19 MMOL/L (ref 10–20)
AST SERPL W P-5'-P-CCNC: 23 U/L (ref 9–39)
BASOPHILS # BLD AUTO: 0.04 X10*3/UL (ref 0–0.1)
BASOPHILS NFR BLD AUTO: 0.7 %
BILIRUB SERPL-MCNC: 0.5 MG/DL (ref 0–1.2)
BUN SERPL-MCNC: 14 MG/DL (ref 6–23)
CALCIUM SERPL-MCNC: 9.8 MG/DL (ref 8.6–10.6)
CHLORIDE SERPL-SCNC: 102 MMOL/L (ref 98–107)
CHOLEST SERPL-MCNC: 174 MG/DL (ref 0–199)
CHOLESTEROL/HDL RATIO: 3.7
CO2 SERPL-SCNC: 24 MMOL/L (ref 21–32)
CREAT SERPL-MCNC: 0.75 MG/DL (ref 0.5–1.05)
EOSINOPHIL # BLD AUTO: 0.24 X10*3/UL (ref 0–0.7)
EOSINOPHIL NFR BLD AUTO: 4.4 %
ERYTHROCYTE [DISTWIDTH] IN BLOOD BY AUTOMATED COUNT: 15.4 % (ref 11.5–14.5)
EST. AVERAGE GLUCOSE BLD GHB EST-MCNC: 126 MG/DL
GFR SERPL CREATININE-BSD FRML MDRD: >90 ML/MIN/1.73M*2
GLUCOSE SERPL-MCNC: 115 MG/DL (ref 74–99)
HBA1C MFR BLD: 6 %
HCT VFR BLD AUTO: 40.4 % (ref 36–46)
HDLC SERPL-MCNC: 46.5 MG/DL
HGB BLD-MCNC: 12.1 G/DL (ref 12–16)
IMM GRANULOCYTES # BLD AUTO: 0.02 X10*3/UL (ref 0–0.7)
IMM GRANULOCYTES NFR BLD AUTO: 0.4 % (ref 0–0.9)
LDLC SERPL CALC-MCNC: 86 MG/DL
LYMPHOCYTES # BLD AUTO: 1.6 X10*3/UL (ref 1.2–4.8)
LYMPHOCYTES NFR BLD AUTO: 29.2 %
MCH RBC QN AUTO: 25.7 PG (ref 26–34)
MCHC RBC AUTO-ENTMCNC: 30 G/DL (ref 32–36)
MCV RBC AUTO: 86 FL (ref 80–100)
MONOCYTES # BLD AUTO: 0.29 X10*3/UL (ref 0.1–1)
MONOCYTES NFR BLD AUTO: 5.3 %
NEUTROPHILS # BLD AUTO: 3.29 X10*3/UL (ref 1.2–7.7)
NEUTROPHILS NFR BLD AUTO: 60 %
NON HDL CHOLESTEROL: 128 MG/DL (ref 0–149)
NRBC BLD-RTO: 0 /100 WBCS (ref 0–0)
PLATELET # BLD AUTO: 239 X10*3/UL (ref 150–450)
POTASSIUM SERPL-SCNC: 4.4 MMOL/L (ref 3.5–5.3)
PROT SERPL-MCNC: 6.7 G/DL (ref 6.4–8.2)
RBC # BLD AUTO: 4.71 X10*6/UL (ref 4–5.2)
SODIUM SERPL-SCNC: 141 MMOL/L (ref 136–145)
TRIGL SERPL-MCNC: 208 MG/DL (ref 0–149)
TSH SERPL-ACNC: 1.65 MIU/L (ref 0.44–3.98)
VLDL: 42 MG/DL (ref 0–40)
WBC # BLD AUTO: 5.5 X10*3/UL (ref 4.4–11.3)

## 2023-11-02 PROCEDURE — 80053 COMPREHEN METABOLIC PANEL: CPT

## 2023-11-02 PROCEDURE — 85025 COMPLETE CBC W/AUTO DIFF WBC: CPT

## 2023-11-02 PROCEDURE — 3078F DIAST BP <80 MM HG: CPT | Performed by: NURSE PRACTITIONER

## 2023-11-02 PROCEDURE — 84443 ASSAY THYROID STIM HORMONE: CPT

## 2023-11-02 PROCEDURE — 90715 TDAP VACCINE 7 YRS/> IM: CPT | Performed by: NURSE PRACTITIONER

## 2023-11-02 PROCEDURE — 3008F BODY MASS INDEX DOCD: CPT | Performed by: NURSE PRACTITIONER

## 2023-11-02 PROCEDURE — 3074F SYST BP LT 130 MM HG: CPT | Performed by: NURSE PRACTITIONER

## 2023-11-02 PROCEDURE — 90471 IMMUNIZATION ADMIN: CPT | Performed by: NURSE PRACTITIONER

## 2023-11-02 PROCEDURE — 82306 VITAMIN D 25 HYDROXY: CPT

## 2023-11-02 PROCEDURE — 36415 COLL VENOUS BLD VENIPUNCTURE: CPT

## 2023-11-02 PROCEDURE — 1036F TOBACCO NON-USER: CPT | Performed by: NURSE PRACTITIONER

## 2023-11-02 PROCEDURE — 83036 HEMOGLOBIN GLYCOSYLATED A1C: CPT

## 2023-11-02 PROCEDURE — 80061 LIPID PANEL: CPT

## 2023-11-02 PROCEDURE — 99396 PREV VISIT EST AGE 40-64: CPT | Performed by: NURSE PRACTITIONER

## 2023-11-02 ASSESSMENT — ENCOUNTER SYMPTOMS
LOSS OF SENSATION IN FEET: 0
DEPRESSION: 0
OCCASIONAL FEELINGS OF UNSTEADINESS: 0

## 2023-11-02 NOTE — PROGRESS NOTES
Subjective   Patient ID: Sole Moreno is a 58 y.o. female who presents for Annual Exam.    HPI   Patient in office for physical. Patient describes health as good. Patient denies vision changes. Patient denies hearing loss.     Lifestyle: . Patient exercises infrequently.  Patient does not smoke; she drinks alcohol occasionally.  No drug use.     Reproductive health:  GTAL: 3213  LMP: 2012     Screens:     Female:  PAP/HPV: Reviewed and current. 2021/2026 (sees OB/GYN)  Breast Cancer: Reviewed and current. order mammogram   Colon cancer screening: Reviewed and current. 2019/2024 (per patient report, she sees a gastroenterologist)   Vision/hearing: Reviewed and current.      Immunizations:  -TDaP: today  -Shingles: 1 x received/2nd scheduled at pharmacy  -Pneumonia: patient declines  -Covid: patient will get booster at pharmacy  -Flu: patient declines  -Hep B: patient declines  -Morbid obesity; patient declines referral to nutritionist; she will check with her insurance if they cover Ozempic, Wegovy, or Mounjaro; advocated healthy diet and exercise     The patient declines some recommended immunizations. Risks and benefits of immunizations. The patient verbalized understanding.     Concerns:  -HTN; controlled  -DM II; check labs  -Hyperlipidemia; check labs  -Asthma; mild; controlled  -Depression; controlled; patient no longer takes Sertraline  -Situational anxiety; controlled    Review of Systems  Constitutional:  Negative for activity change, appetite change, chills, diaphoresis, fatigue, fever and unexpected weight change.   HENT: Negative for congestion, ear pain, dental problem, drooling, ear discharge, facial swelling, hearing loss, mouth sores, nosebleeds, postnasal drip, rhinorrhea, sinus pressure, sinus pain, sneezing, sore throat, tinnitus, trouble swallowing and voice change. Eyes:  Negative for photophobia, pain, discharge, redness, itching and visual disturbance.   Respiratory:  Negative for  "apnea, cough, choking, chest tightness, shortness of breath, wheezing and stridor.    Cardiovascular:  Negative for chest pain, palpitations and leg swelling.   Gastrointestinal:  Negative for abdominal distention, abdominal pain, anal bleeding, blood in stool, constipation, diarrhea, nausea, rectal pain and vomiting.   Endocrine: Negative for cold intolerance, heat intolerance, polydipsia, polyphagia and polyuria.   Genitourinary:  Negative for decreased urine volume, difficulty urinating, dysuria, enuresis, flank pain, frequency, hematuria, menstrual problem, pelvic pain and urgency.   Musculoskeletal:  Negative for back pain, gait problem, joint swelling, myalgias, neck pain and neck stiffness.   Skin:  Negative for color change, pallor, rash and wound.   Neurological:  Negative for dizziness, tremors, seizures, syncope, facial asymmetry, speech difficulty, weakness, light-headedness, numbness and headaches.   Hematological:  Negative for adenopathy. Does not bruise/bleed easily.   Psychiatric/Behavioral:  Negative for agitation, behavioral problems, confusion, decreased concentration, dysphoric mood, hallucinations, self-injury, sleep disturbance and suicidal ideas. The patient is not nervous/anxious and is not hyperactive.    Objective   /64   Pulse 73   Temp 36.8 °C (98.3 °F) (Temporal)   Ht 1.683 m (5' 6.25\")   Wt 121 kg (266 lb)   SpO2 96%   BMI 42.61 kg/m²     Physical Exam  Constitutional:       Appearance: Normal appearance. Obese.  HENT:      Head: Normocephalic.      Right Ear: Tympanic membrane, ear canal and external ear normal.      Left Ear: Tympanic membrane, ear canal and external ear normal.      Nose: Nose normal.      Mouth/Throat:      Mouth: Mucous membranes are moist.      Pharynx: Oropharynx is clear.   Eyes:      General: No scleral icterus.        Right eye: No discharge.         Left eye: No discharge.      Conjunctiva/sclera: Conjunctivae normal.      Pupils: Pupils are " equal, round, and reactive to light.   Neck:      Vascular: No carotid bruit.   Cardiovascular:      Rate and Rhythm: Normal rate and regular rhythm.      Pulses: Normal pulses.      Heart sounds: Normal heart sounds.   Pulmonary:      Effort: Pulmonary effort is normal.      Breath sounds: Normal breath sounds.   Abdominal:      General: Abdomen is flat. Bowel sounds are normal. There is no distension.      Palpations: Abdomen is soft. There is no mass.      Tenderness: There is no abdominal tenderness. There is no guarding or rebound.      Hernia: No hernia is present.   Musculoskeletal:         General: No swelling, tenderness, deformity or signs of injury.      Cervical back: Normal range of motion and neck supple. No rigidity or tenderness.      Right lower leg: No edema.      Left lower leg: No edema.   Lymphadenopathy:      Cervical: No cervical adenopathy.   Skin:     General: Skin is warm.      Coloration: Skin is not jaundiced or pale.      Findings: No bruising, erythema, lesion or rash. Lipomas to back  Neurological:      General: No focal deficit present.      Mental Status: She is alert and oriented to person, place, and time.      Cranial Nerves: No cranial nerve deficit.      Sensory: No sensory deficit.      Motor: No weakness.      Coordination: Coordination normal.      Gait: Gait normal.      DTR: normal.  Psychiatric:         Mood and Affect: Mood normal.         Behavior: Behavior normal.         Thought Content: Thought content normal.      Judgment: Judgment normal.     Assessment/Plan     Exam findings reviewed with patient. Health screens, lab work orders, medications,  specialty provider follow-ups,   and immunizations discussed. We will call with lab results and update the patient on the plan of care. Patient will keep monitoring blood pressures and blood sugars at home; she will call the office with outliers or abnormal trends. Follow-up in 6 months for medication refills or earlier as  needed.      Benefits of healthy lifestyle discussed: low carbohydrates/fat/sugar diet; use lean white instead of red meat; use several servings of fruit and vegetables daily; use whole grain flour instead of white flour products; cook at home frequently instead of eating out; exercise 30-90 minutes 5 x/week; good sleep hygiene; stress reduction and prevention strategies; avoid stimulants like caffeine and nicotine; avoid depressants like alcohol; maintain adequate support systems and positive relationships.

## 2023-11-03 NOTE — RESULT ENCOUNTER NOTE
Please call the patient. Stable labs. A1C: 6.0%; at goal. Cholesterol is normal except triglycerides, which remain elevated. Advocate healthy diet and exercise. Watch sugar, starch, and carbohydrate intake. Advocate proper hydration. Vitamin D level is low. Start taking OTC Vitamin D3 1000 units/day. Repeat Vitamin D level in 3 months, A1C in 6 months, other labs in 12 months. TY

## 2023-11-16 DIAGNOSIS — M54.50 LOW BACK PAIN WITHOUT SCIATICA, UNSPECIFIED BACK PAIN LATERALITY, UNSPECIFIED CHRONICITY: ICD-10-CM

## 2023-11-16 RX ORDER — CYCLOBENZAPRINE HCL 5 MG
5 TABLET ORAL 3 TIMES DAILY PRN
Qty: 30 TABLET | Refills: 0 | Status: SHIPPED | OUTPATIENT
Start: 2023-11-16 | End: 2023-12-19 | Stop reason: SDUPTHER

## 2023-11-16 NOTE — TELEPHONE ENCOUNTER
Sole called and wanted to know if you could refill the following (she saw you on 11/2/23 but forgot to ask for a refill at that time)    Cyclobenzaprine 5 mg take 1 tablet 3x's daily PRN    [address]  [id number] [phone]

## 2023-12-12 DIAGNOSIS — M79.651 PAIN OF RIGHT THIGH: Primary | ICD-10-CM

## 2023-12-19 DIAGNOSIS — M54.50 LOW BACK PAIN WITHOUT SCIATICA, UNSPECIFIED BACK PAIN LATERALITY, UNSPECIFIED CHRONICITY: ICD-10-CM

## 2023-12-19 RX ORDER — CYCLOBENZAPRINE HCL 5 MG
5 TABLET ORAL 3 TIMES DAILY PRN
Qty: 30 TABLET | Refills: 0 | Status: SHIPPED | OUTPATIENT
Start: 2023-12-19 | End: 2024-02-19 | Stop reason: SDUPTHER

## 2023-12-19 NOTE — TELEPHONE ENCOUNTER
Patient had a mammogram done, 2 weeks ago, at [facility], calling for results. Also, requesting refill for muscle relaxant, [facility] [place].

## 2023-12-19 NOTE — TELEPHONE ENCOUNTER
I called [facility] and she stated there just waiting for the doctor to sign off on it and then she will sent it over.

## 2024-01-11 ENCOUNTER — OFFICE VISIT (OUTPATIENT)
Dept: SURGERY | Facility: CLINIC | Age: 59
End: 2024-01-11
Payer: COMMERCIAL

## 2024-01-11 DIAGNOSIS — D17.23 LIPOMA OF RIGHT LOWER EXTREMITY: Primary | ICD-10-CM

## 2024-01-11 PROCEDURE — 3008F BODY MASS INDEX DOCD: CPT | Performed by: PHYSICIAN ASSISTANT

## 2024-01-11 PROCEDURE — 99214 OFFICE O/P EST MOD 30 MIN: CPT | Performed by: PHYSICIAN ASSISTANT

## 2024-01-11 PROCEDURE — 1036F TOBACCO NON-USER: CPT | Performed by: PHYSICIAN ASSISTANT

## 2024-01-11 NOTE — PROGRESS NOTES
Subjective   Patient ID: Sole Moreno is a 58 y.o. female who presents for Follow-up (MRI results).    HPI 58-year-old female with right lateral thigh bump.  Patient was sent for an MR of her leg she is here for discussion of results.  She states she still feels it and sometimes it causes numbness and tingling in her leg.        Review of Systems  Negative other than mentioned in HPI    ENT: No earache, no sore throat, no nosebleeds  Cardiovascular: No chest pain, no shortness of breath, no leg pain, no edema  Respiratory: No shortness of breath on exertion, no wheezing  Gastrointestinal: No abdominal pain, no melena, no nausea, vomiting and/or diarrhea  Musculoskeletal: No pain moving all extremities, no back pain ambulating normally  Skin: No rashes, no lesions, and no skin changes  Neuro: No headache, no confusion, no numbness and tingling  Psychiatric, normal mood, not suicidal, not homicidal, feeling good          Physical Exam  Eyes: Conjunctiva non -icteric and eye lids are without obvious rash or drooping. Pupils are symmetric.   Ears, Nose, Mouth, and Throat: External ears and nose appear to be without deformity or rash. No lesions or masses noted. Hearing is grossly intact.   Neck:. No JVD noted, tracheal position is midline. No thyromegaly, no thyroid nodules  Head and Face: Examination of the head and face revealed no abnormalities.   Respiratory: No gasping or shortness of breath noted, no use of accessory muscles noted. Clear to auscultate bilaterally  Cardiovascular: Examination for edema is normal. Regular rate and rhythm S1 S2 without murmurs  GI: Abdomen non tender to palpation, bowel sounds present no hepatosplenomegaly  Skin: No rashes or open lesions/ulcers identified on skin.   Musk: Digits/nails show no clubbing or cyanosis. No asymmetry or masses noted of the musculature. Examination of the muscles/joints/bones show normal range of motion. Gait is grossly normally.   Neurologic: Cranial  nerves II- XII intact, motor strength 5/5 muscle strength of the lower extremities bilaterally and equal.      Objective     No diagnosis found.   Patient Active Problem List   Diagnosis    Asthma    Benign essential HTN    Depressive disorder    Situational anxiety    Acute pancreatitis    Allergic rhinitis due to mold    Arthritis of back    Back pain    Benign neoplasm of colon    Cataract, nuclear sclerotic, both eyes    Diabetes mellitus, type II (CMS/HCC)    Diabetes mellitus (CMS/HCC)    Gallstone    H/O detached retina repair    History of obesity    History of retinal detachment    HTN (hypertension)    Itching    Kidney stone    Mass of subcutaneous tissue of back    MGD (meibomian gland disease)    Mitral regurgitation    Myopic macular degeneration of both eyes    Headache    Neck pain    Obesity    Other specified soft tissue disorders    Pain of lateral upper thigh    Pathologic myopia, bilateral    Penicillin adverse reaction    PMB (postmenopausal bleeding)    Pure hypercholesterolemia    PVD (posterior vitreous detachment), both eyes    Scoliosis    Vitamin D deficiency    Obstructive sleep apnea syndrome    Depression with anxiety    Anxiety with flying    Condition not found    Chronic bilateral low back pain without sciatica    Morbid obesity with BMI of 40.0-44.9, adult (CMS/HCC)    Elevated lipids      Allergies   Allergen Reactions    Aspirin Hives     Unsure of reaction    Penicillins Hives    Erythromycin Hives      Medication Documentation Review Audit       Reviewed by Yanique Fonseca MA (Medical Assistant) on 01/11/24 at 1413      Medication Order Taking? Sig Documenting Provider Last Dose Status   albuterol (Proventil HFA) 90 mcg/actuation inhaler [id number] No Inhale 2 puffs every 4 hours if needed. Historical Provider, MD Taking Active   cyclobenzaprine (Flexeril) 5 mg tablet [id number]  Take 1 tablet (5 mg) by mouth 3 times a day as needed for muscle spasms. Madan Bruno, APRN-CNP   Active   gabapentin (Neurontin) 100 mg capsule [id number] No Take 1-2 capsules by mouth 3 times daily as needed for situational anxiety QUIN Castillo Taking  23 2359   lisinopriL-hydrochlorothiazide 20-12.5 mg tablet [id number] No Take 2 tablets by mouth once daily. QUIN Castillo Taking Active   metFORMIN (Glucophage) 500 mg tablet [id number]  TAKE 1 TABLET BY MOUTH TWICE A DAY WITH MEALS QUIN Castillo  Active   multivitamin capsule [id number] No Take 1 capsule by mouth once daily. Historical Provider, MD Taking Active   OneTouch Ultra Test strip [id number]  USE AS DIRECTED FOR TESTING BLOOD SUGAR EVERY MORNING BEFORE BREAKFAST & AS NEEDED QUIN Castillo  Active   ULTRA FINE LANCETS MISC [id number] No Check blood sugar once daily in the morning and as needed Historical Provider, MD Taking Active   Ventolin HFA 90 mcg/actuation inhaler [id number] No Inhale 2 puffs every 6 hours if needed. Historical Provider, MD Taking Active                    Past Medical History:   Diagnosis Date    Abnormal levels of other serum enzymes 2018    Elevated liver enzymes    Abnormal levels of other serum enzymes 2018    Elevated liver enzymes    Acute bronchitis, unspecified 2018    Acute bronchitis    Acute bronchitis, unspecified 2018    Acute bronchitis    Dorsalgia, unspecified 2018    Chronic back pain    Dorsalgia, unspecified 2018    Chronic back pain    Encounter for screening mammogram for malignant neoplasm of breast 2018    Encounter for screening mammogram for breast cancer    Encounter for screening mammogram for malignant neoplasm of breast 2018    Encounter for screening mammogram for breast cancer    Enlarged lymph nodes, unspecified 2018    Swollen lymph nodes    Enlarged lymph nodes, unspecified 2018    Swollen lymph nodes    Essential (primary) hypertension 2018    Benign essential HTN    Essential (primary)  hypertension 04/19/2018    Benign essential HTN    Myopia, bilateral 08/18/2016    High myopia, bilateral    Other hypertrophic disorders of the skin 04/19/2018    Cutaneous skin tags    Other hypertrophic disorders of the skin 04/19/2018    Cutaneous skin tags    Other specified anxiety disorders 04/19/2018    Depression with anxiety    Other specified anxiety disorders 04/19/2018    Depression with anxiety    Other specified anxiety disorders 10/11/2022    Situational anxiety    Otitis media, unspecified, unspecified ear 04/19/2018    Acute otitis media    Otitis media, unspecified, unspecified ear 04/19/2018    Acute otitis media    Pain in right hip 04/19/2018    Hip pain, chronic, right    Pain in right hip 04/19/2018    Hip pain, chronic, right    Pain in right knee 04/19/2018    Right knee pain    Pain in right knee 04/19/2018    Right knee pain    Personal history of other endocrine, nutritional and metabolic disease 11/23/2015    History of hypokalemia    Personal history of other medical treatment 01/18/2018    History of screening mammography    Psoriasis, unspecified 04/19/2018    Psoriasis    Psoriasis, unspecified 04/19/2018    Psoriasis    Pure hypercholesterolemia, unspecified 04/19/2018    High cholesterol    Pure hypercholesterolemia, unspecified 04/19/2018    High cholesterol    Radiculopathy, cervical region 04/19/2018    Cervical radiculopathy    Radiculopathy, cervical region 04/19/2018    Cervical radiculopathy    Scoliosis, unspecified 04/19/2018    Scoliosis    Scoliosis, unspecified 04/19/2018    Scoliosis    Sleep apnea, unspecified 04/19/2018    Sleep apnea    Sleep apnea, unspecified 04/19/2018    Sleep apnea    Spondylosis, unspecified 04/19/2018    Arthritis of back    Spondylosis, unspecified 04/19/2018    Arthritis of back    Type 2 diabetes mellitus without complications (CMS/[facility]) 04/19/2018    Diabetes mellitus, type II    Type 2 diabetes mellitus without complications (CMS/[facility])  2018    Diabetes mellitus, type II    Unspecified asthma, uncomplicated 2018    Asthmatic bronchitis    Unspecified asthma, uncomplicated 2018    Asthma    Unspecified asthma, uncomplicated 2018    Asthmatic bronchitis    Unspecified asthma, uncomplicated 2018    Asthma    Unspecified cataract 2016    Cataract of right eye    Unspecified cataract 2016    Cataract of left eye    Unspecified nonsuppurative otitis media, unspecified ear 2018    Otitis, serous    Unspecified nonsuppurative otitis media, unspecified ear 2020    Otitis, serous    Vitamin D deficiency, unspecified 2018    Vitamin D deficiency     Social History     Tobacco Use   Smoking Status Former    Types: Cigarettes    Quit date: 2003    Years since quittin.0   Smokeless Tobacco Never     Family History   Problem Relation Name Age of Onset    Hypertension Mother      Osteoporosis Mother      Other (Cerebrovascular accident (CVA)) Father      Multiple sclerosis Sibling        Past Surgical History:   Procedure Laterality Date     SECTION, CLASSIC  2014     Section    GALLBLADDER SURGERY  2014    Gallbladder Surgery    KNEE ARTHROSCOPY W/ MENISCAL REPAIR  2014    Knee Arthroscopy With Lateral Meniscus Repair       Assessment/Plan    Pt had MR the  right leg.  It evaluated this mass that she feels in her thigh is a encapsulated lipoma.  This was discussed with the patient in detail.  She is going to consider if she wants to have it excised.    Encounter Diagnosis   Name Primary?    Lipoma of right lower extremity Yes         **Portions of this medical record have been created using voice recognition software and may have minor errors which are inherent in voice recognition systems. It has not been fully edited for typographical or grammatical errors**

## 2024-01-24 DIAGNOSIS — E11.9 TYPE 2 DIABETES MELLITUS WITHOUT COMPLICATION, UNSPECIFIED WHETHER LONG TERM INSULIN USE (MULTI): ICD-10-CM

## 2024-01-24 RX ORDER — BLOOD SUGAR DIAGNOSTIC
STRIP MISCELLANEOUS
Qty: 100 EACH | Refills: 3 | Status: SHIPPED | OUTPATIENT
Start: 2024-01-24

## 2024-01-24 RX ORDER — BLOOD SUGAR DIAGNOSTIC
STRIP MISCELLANEOUS
Qty: 25 STRIP | Refills: 1 | OUTPATIENT
Start: 2024-01-24

## 2024-02-01 ENCOUNTER — OFFICE VISIT (OUTPATIENT)
Dept: OPHTHALMOLOGY | Facility: CLINIC | Age: 59
End: 2024-02-01
Payer: COMMERCIAL

## 2024-02-01 DIAGNOSIS — H02.889 MEIBOMIAN GLAND DISEASE, UNSPECIFIED LATERALITY: ICD-10-CM

## 2024-02-01 DIAGNOSIS — H43.813 PVD (POSTERIOR VITREOUS DETACHMENT), BOTH EYES: ICD-10-CM

## 2024-02-01 DIAGNOSIS — H52.4 MYOPIA OF BOTH EYES WITH ASTIGMATISM AND PRESBYOPIA: ICD-10-CM

## 2024-02-01 DIAGNOSIS — E11.9 TYPE 2 DIABETES MELLITUS WITHOUT COMPLICATION, WITHOUT LONG-TERM CURRENT USE OF INSULIN (MULTI): ICD-10-CM

## 2024-02-01 DIAGNOSIS — H35.30 MYOPIC MACULAR DEGENERATION OF BOTH EYES: ICD-10-CM

## 2024-02-01 DIAGNOSIS — H52.13 MYOPIA OF BOTH EYES WITH ASTIGMATISM AND PRESBYOPIA: ICD-10-CM

## 2024-02-01 DIAGNOSIS — Z98.890 H/O DETACHED RETINA REPAIR: ICD-10-CM

## 2024-02-01 DIAGNOSIS — Z86.69 HISTORY OF RETINAL DETACHMENT: ICD-10-CM

## 2024-02-01 DIAGNOSIS — H52.203 MYOPIA OF BOTH EYES WITH ASTIGMATISM AND PRESBYOPIA: ICD-10-CM

## 2024-02-01 DIAGNOSIS — H25.13 CATARACT, NUCLEAR SCLEROTIC, BOTH EYES: Primary | ICD-10-CM

## 2024-02-01 DIAGNOSIS — Z86.69 H/O DETACHED RETINA REPAIR: ICD-10-CM

## 2024-02-01 PROCEDURE — 3008F BODY MASS INDEX DOCD: CPT | Performed by: OPTOMETRIST

## 2024-02-01 PROCEDURE — 1036F TOBACCO NON-USER: CPT | Performed by: OPTOMETRIST

## 2024-02-01 PROCEDURE — 92015 DETERMINE REFRACTIVE STATE: CPT | Performed by: OPTOMETRIST

## 2024-02-01 PROCEDURE — 92014 COMPRE OPH EXAM EST PT 1/>: CPT | Performed by: OPTOMETRIST

## 2024-02-01 ASSESSMENT — TONOMETRY
IOP_METHOD: GOLDMANN APPLANATION
OD_IOP_MMHG: 22
OS_IOP_MMHG: 21

## 2024-02-01 ASSESSMENT — CONF VISUAL FIELD
OS_SUPERIOR_NASAL_RESTRICTION: 0
OD_SUPERIOR_NASAL_RESTRICTION: 0
OD_INFERIOR_NASAL_RESTRICTION: 0
OS_NORMAL: 1
OS_INFERIOR_TEMPORAL_RESTRICTION: 0
OS_SUPERIOR_TEMPORAL_RESTRICTION: 0
OD_NORMAL: 1
OD_INFERIOR_TEMPORAL_RESTRICTION: 0
OS_INFERIOR_NASAL_RESTRICTION: 0
OD_SUPERIOR_TEMPORAL_RESTRICTION: 0

## 2024-02-01 ASSESSMENT — REFRACTION_WEARINGRX
OD_SPHERE: -15.75
OD_AXIS: 177
OS_AXIS: 105
OS_SPHERE: -16.25
OD_CYLINDER: -0.75
OS_CYLINDER: -0.75

## 2024-02-01 ASSESSMENT — ENCOUNTER SYMPTOMS
ALLERGIC/IMMUNOLOGIC NEGATIVE: 0
HEMATOLOGIC/LYMPHATIC NEGATIVE: 0
EYES NEGATIVE: 0
CARDIOVASCULAR NEGATIVE: 0
NEUROLOGICAL NEGATIVE: 0
RESPIRATORY NEGATIVE: 0
MUSCULOSKELETAL NEGATIVE: 0
ENDOCRINE NEGATIVE: 0
GASTROINTESTINAL NEGATIVE: 0
CONSTITUTIONAL NEGATIVE: 0
PSYCHIATRIC NEGATIVE: 0

## 2024-02-01 ASSESSMENT — REFRACTION_CURRENTRX
OS_BRAND: RGP
OS_SPHERE: -14.25
OS_DIAMETER: 9.5
OD_BRAND: RGP
OD_BASECURVE: 7.7
OD_SPHERE: -13.50
OD_DIAMETER: 9.5
OS_BASECURVE: 7.7

## 2024-02-01 ASSESSMENT — REFRACTION_MANIFEST
OD_AXIS: 180
OD_CYLINDER: -0.75
OS_ADD: +2.50
OD_ADD: +2.50
OS_SPHERE: -18.00
OD_SPHERE: -19.00
OS_CYLINDER: -0.50
OS_AXIS: 015

## 2024-02-01 ASSESSMENT — SLIT LAMP EXAM - LIDS
COMMENTS: GOOD POSITION
COMMENTS: GOOD POSITION

## 2024-02-01 ASSESSMENT — VISUAL ACUITY
OD_CC: 20/200
OS_CC: 20/40
CORRECTION_TYPE: CONTACTS
METHOD: SNELLEN - LINEAR
VA_OR_OD_CURRENT_RX: 20/80
VA_OR_OS_CURRENT_RX: 20/40

## 2024-02-01 ASSESSMENT — CUP TO DISC RATIO
OD_RATIO: .3
OS_RATIO: .3

## 2024-02-01 ASSESSMENT — EXTERNAL EXAM - RIGHT EYE: OD_EXAM: NORMAL

## 2024-02-01 ASSESSMENT — EXTERNAL EXAM - LEFT EYE: OS_EXAM: NORMAL

## 2024-02-01 NOTE — PROGRESS NOTES
A spectacle prescription was dispensed to be used as needed.  High index.      Changed RGP wear to DVO OU and will need to wear OTC readers.      VA 20/70 OD and 20/40 OS and has been in the 20/40 to 20/50 range OD and OS.  OD worse. Myopic progression OD as well. Explained that computer use +1.75 D and for reading closer try to get +3.50 D and hold at 1 foot.       Hx of RD OD post cryo and attached and The patient was asked to return to our clinic or seek out eye care ASAP if new flashes of light or floaters are noted.      Myopic progression and BCVA OD 20/80- with contact lens and changed due to milky nuclear sclerotic cataract as well as mild early peripheral PSC ST OD.     Referred to cataract surgeon for pre-op testing. Patient does not have the option of keeping the rigid gas permeable (RGP) out for a full month as she doesn't have glasses that work well. Patient relies on RGPs. May need repeat pre-op visits to get the most accurate measurements. Cataract surgeon to make a plan with patient. (OD has had cryo and RD as well)

## 2024-02-14 ENCOUNTER — OFFICE VISIT (OUTPATIENT)
Dept: PRIMARY CARE | Facility: CLINIC | Age: 59
End: 2024-02-14
Payer: COMMERCIAL

## 2024-02-14 VITALS
SYSTOLIC BLOOD PRESSURE: 135 MMHG | TEMPERATURE: 97.9 F | OXYGEN SATURATION: 97 % | DIASTOLIC BLOOD PRESSURE: 81 MMHG | HEART RATE: 77 BPM

## 2024-02-14 DIAGNOSIS — E11.9 TYPE 2 DIABETES MELLITUS WITHOUT COMPLICATION, WITHOUT LONG-TERM CURRENT USE OF INSULIN (MULTI): ICD-10-CM

## 2024-02-14 DIAGNOSIS — I70.90 ATHEROSCLEROSIS: ICD-10-CM

## 2024-02-14 DIAGNOSIS — E55.9 VITAMIN D DEFICIENCY: ICD-10-CM

## 2024-02-14 DIAGNOSIS — M25.50 MULTIPLE JOINT PAIN: Primary | ICD-10-CM

## 2024-02-14 PROCEDURE — 82607 VITAMIN B-12: CPT

## 2024-02-14 PROCEDURE — 99214 OFFICE O/P EST MOD 30 MIN: CPT | Performed by: NURSE PRACTITIONER

## 2024-02-14 PROCEDURE — 36415 COLL VENOUS BLD VENIPUNCTURE: CPT

## 2024-02-14 PROCEDURE — 85652 RBC SED RATE AUTOMATED: CPT

## 2024-02-14 PROCEDURE — 80053 COMPREHEN METABOLIC PANEL: CPT

## 2024-02-14 PROCEDURE — 3075F SYST BP GE 130 - 139MM HG: CPT | Performed by: NURSE PRACTITIONER

## 2024-02-14 PROCEDURE — 83036 HEMOGLOBIN GLYCOSYLATED A1C: CPT

## 2024-02-14 PROCEDURE — 86431 RHEUMATOID FACTOR QUANT: CPT

## 2024-02-14 PROCEDURE — 86200 CCP ANTIBODY: CPT

## 2024-02-14 PROCEDURE — 82306 VITAMIN D 25 HYDROXY: CPT

## 2024-02-14 PROCEDURE — 84550 ASSAY OF BLOOD/URIC ACID: CPT

## 2024-02-14 PROCEDURE — 3008F BODY MASS INDEX DOCD: CPT | Performed by: NURSE PRACTITIONER

## 2024-02-14 PROCEDURE — 3079F DIAST BP 80-89 MM HG: CPT | Performed by: NURSE PRACTITIONER

## 2024-02-14 PROCEDURE — 86038 ANTINUCLEAR ANTIBODIES: CPT

## 2024-02-14 PROCEDURE — 83735 ASSAY OF MAGNESIUM: CPT

## 2024-02-14 PROCEDURE — 85025 COMPLETE CBC W/AUTO DIFF WBC: CPT

## 2024-02-14 PROCEDURE — 1036F TOBACCO NON-USER: CPT | Performed by: NURSE PRACTITIONER

## 2024-02-14 ASSESSMENT — ENCOUNTER SYMPTOMS
DIZZINESS: 0
ADENOPATHY: 0
FLANK PAIN: 0
LOSS OF SENSATION IN FEET: 0
OCCASIONAL FEELINGS OF UNSTEADINESS: 0
ACTIVITY CHANGE: 0
ARTHRALGIAS: 1
APPETITE CHANGE: 0
UNEXPECTED WEIGHT CHANGE: 0
HEADACHES: 0
DEPRESSION: 0
CHILLS: 0
MYALGIAS: 0
NAUSEA: 0
BRUISES/BLEEDS EASILY: 0
CHEST TIGHTNESS: 0
ABDOMINAL PAIN: 0
DIARRHEA: 0
SHORTNESS OF BREATH: 0
COLOR CHANGE: 0
PALPITATIONS: 0
FATIGUE: 0
STRIDOR: 0
WOUND: 0
WEAKNESS: 0
FEVER: 0
BACK PAIN: 0
COUGH: 0
APNEA: 0
WHEEZING: 0
CHOKING: 0
VOMITING: 0
LIGHT-HEADEDNESS: 0
DIAPHORESIS: 0

## 2024-02-14 NOTE — PROGRESS NOTES
Subjective   Patient ID: Sole Moreno is a 58 y.o. female who presents for multiple joint x 3 months.    HPI   Patient in office for multiple joint pain x 3 months. She recently had Xrays of her left shoulder (normal) and cervical spine (spurring and arthrosis) done at the [facility]; there was an incidental finding of calcification in her right carotid artery; the patient will address this finding with her cardiologist. The bone pain is episodic, mostly at night, in her shoulders, color bones, upper arms, elbows, and knees. She denies any other symptoms. No other concerns today.     Review of Systems   Constitutional:  Negative for activity change, appetite change, chills, diaphoresis, fatigue, fever and unexpected weight change.   Respiratory:  Negative for apnea, cough, choking, chest tightness, shortness of breath, wheezing and stridor.    Cardiovascular:  Negative for chest pain, palpitations and leg swelling.   Gastrointestinal:  Negative for abdominal pain, diarrhea, nausea and vomiting.   Genitourinary:  Negative for flank pain and pelvic pain.   Musculoskeletal:  Positive for arthralgias. Negative for back pain, gait problem and myalgias.        Multiple joint and bone pain   Skin:  Negative for color change, pallor, rash and wound.   Neurological:  Negative for dizziness, syncope, weakness, light-headedness and headaches.   Hematological:  Negative for adenopathy. Does not bruise/bleed easily.       Objective   /81   Pulse 77   Temp 36.6 °C (97.9 °F) (Temporal)   SpO2 97%     Physical Exam  Constitutional:       Appearance: Normal appearance. She is obese.   Eyes:      Conjunctiva/sclera: Conjunctivae normal.   Neck:      Vascular: No carotid bruit.   Cardiovascular:      Rate and Rhythm: Normal rate and regular rhythm.      Pulses: Normal pulses.      Heart sounds: Normal heart sounds.   Pulmonary:      Effort: Pulmonary effort is normal.      Breath sounds: Normal breath sounds.   Musculoskeletal:          General: No swelling, tenderness, deformity or signs of injury. Normal range of motion.      Cervical back: Normal range of motion and neck supple. No rigidity or tenderness.   Lymphadenopathy:      Cervical: No cervical adenopathy.   Skin:     General: Skin is warm.      Coloration: Skin is not jaundiced or pale.      Findings: No bruising, erythema, lesion or rash.   Neurological:      General: No focal deficit present.      Mental Status: She is alert and oriented to person, place, and time.      Cranial Nerves: No cranial nerve deficit.      Sensory: No sensory deficit.      Motor: No weakness.      Coordination: Coordination normal.      Gait: Gait normal.   Psychiatric:         Mood and Affect: Mood normal.       Assessment/Plan     Exam findings reviewed with patient. Medications and lab orders discussed. The patient may use OTC pain medication as needed. Consider referral to rheumatology. Will call with lab results and update the plan if care. Benefits of healthy diet, lifestyle, and exercise reviewed. Follow up in 1 month or earlier if no improvement.

## 2024-02-15 LAB
25(OH)D3 SERPL-MCNC: 26 NG/ML (ref 30–100)
ALBUMIN SERPL BCP-MCNC: 4.5 G/DL (ref 3.4–5)
ALP SERPL-CCNC: 81 U/L (ref 33–136)
ALT SERPL W P-5'-P-CCNC: 38 U/L (ref 7–45)
ANA SER QL HEP2 SUBST: NEGATIVE
ANION GAP SERPL CALC-SCNC: 12 MMOL/L (ref 10–20)
AST SERPL W P-5'-P-CCNC: 20 U/L (ref 9–39)
BASOPHILS # BLD AUTO: 0.04 X10*3/UL (ref 0–0.1)
BASOPHILS NFR BLD AUTO: 0.8 %
BILIRUB SERPL-MCNC: 0.5 MG/DL (ref 0–1.2)
BUN SERPL-MCNC: 17 MG/DL (ref 6–23)
CALCIUM SERPL-MCNC: 10 MG/DL (ref 8.6–10.6)
CCP IGG SERPL-ACNC: <1 U/ML
CHLORIDE SERPL-SCNC: 102 MMOL/L (ref 98–107)
CO2 SERPL-SCNC: 30 MMOL/L (ref 21–32)
CREAT SERPL-MCNC: 0.72 MG/DL (ref 0.5–1.05)
EGFRCR SERPLBLD CKD-EPI 2021: >90 ML/MIN/1.73M*2
EOSINOPHIL # BLD AUTO: 0.21 X10*3/UL (ref 0–0.7)
EOSINOPHIL NFR BLD AUTO: 4 %
ERYTHROCYTE [DISTWIDTH] IN BLOOD BY AUTOMATED COUNT: 14.9 % (ref 11.5–14.5)
ERYTHROCYTE [SEDIMENTATION RATE] IN BLOOD BY WESTERGREN METHOD: 21 MM/H (ref 0–30)
EST. AVERAGE GLUCOSE BLD GHB EST-MCNC: 134 MG/DL
GLUCOSE SERPL-MCNC: 132 MG/DL (ref 74–99)
HBA1C MFR BLD: 6.3 %
HCT VFR BLD AUTO: 38.4 % (ref 36–46)
HGB BLD-MCNC: 12.1 G/DL (ref 12–16)
IMM GRANULOCYTES # BLD AUTO: 0.02 X10*3/UL (ref 0–0.7)
IMM GRANULOCYTES NFR BLD AUTO: 0.4 % (ref 0–0.9)
LYMPHOCYTES # BLD AUTO: 1.62 X10*3/UL (ref 1.2–4.8)
LYMPHOCYTES NFR BLD AUTO: 30.8 %
MAGNESIUM SERPL-MCNC: 2.06 MG/DL (ref 1.6–2.4)
MCH RBC QN AUTO: 26.4 PG (ref 26–34)
MCHC RBC AUTO-ENTMCNC: 31.5 G/DL (ref 32–36)
MCV RBC AUTO: 84 FL (ref 80–100)
MONOCYTES # BLD AUTO: 0.27 X10*3/UL (ref 0.1–1)
MONOCYTES NFR BLD AUTO: 5.1 %
NEUTROPHILS # BLD AUTO: 3.1 X10*3/UL (ref 1.2–7.7)
NEUTROPHILS NFR BLD AUTO: 58.9 %
NRBC BLD-RTO: 0 /100 WBCS (ref 0–0)
PLATELET # BLD AUTO: 255 X10*3/UL (ref 150–450)
POTASSIUM SERPL-SCNC: 3.8 MMOL/L (ref 3.5–5.3)
PROT SERPL-MCNC: 6.6 G/DL (ref 6.4–8.2)
RBC # BLD AUTO: 4.58 X10*6/UL (ref 4–5.2)
RHEUMATOID FACT SER NEPH-ACNC: <10 IU/ML (ref 0–15)
SODIUM SERPL-SCNC: 140 MMOL/L (ref 136–145)
URATE SERPL-MCNC: 7.6 MG/DL (ref 2.3–6.7)
VIT B12 SERPL-MCNC: 455 PG/ML (ref 211–911)
WBC # BLD AUTO: 5.3 X10*3/UL (ref 4.4–11.3)

## 2024-02-15 NOTE — RESULT ENCOUNTER NOTE
Please call the patient. Stable labs. A1C: 6.3% (at goal). Rheumatoid arthritis panel is normal except uric acid level, which is slightly elevated. Vitamin D level is low. Start taking Vitamin D3 OTC, 2000 units/day. Recheck Vitamin level and uric acid level in month. Stay well-hydrated. TY

## 2024-02-19 ENCOUNTER — TELEPHONE (OUTPATIENT)
Dept: PRIMARY CARE | Facility: CLINIC | Age: 59
End: 2024-02-19
Payer: COMMERCIAL

## 2024-02-19 DIAGNOSIS — M54.50 LOW BACK PAIN WITHOUT SCIATICA, UNSPECIFIED BACK PAIN LATERALITY, UNSPECIFIED CHRONICITY: ICD-10-CM

## 2024-02-19 RX ORDER — CYCLOBENZAPRINE HCL 5 MG
5 TABLET ORAL 3 TIMES DAILY PRN
Qty: 30 TABLET | Refills: 0 | Status: SHIPPED | OUTPATIENT
Start: 2024-02-19 | End: 2024-04-26 | Stop reason: SDUPTHER

## 2024-02-19 RX ORDER — ALBUTEROL SULFATE 90 UG/1
2 AEROSOL, METERED RESPIRATORY (INHALATION) EVERY 4 HOURS PRN
Qty: 18 G | Refills: 2 | Status: SHIPPED | OUTPATIENT
Start: 2024-02-19 | End: 2024-05-06

## 2024-02-19 NOTE — TELEPHONE ENCOUNTER
Sole was in last week and she said that 2 of her prescriptions weren't at the pharmacy.  Can you please send the following:    Albuterol Inhaler  Cyclobenzaprine 5 mg 1 tablet TID  PRN    [facility]

## 2024-03-10 DIAGNOSIS — E55.9 VITAMIN D DEFICIENCY: Primary | ICD-10-CM

## 2024-03-10 DIAGNOSIS — E79.0 ABNORMAL BLOOD LEVEL OF URIC ACID: ICD-10-CM

## 2024-03-11 ENCOUNTER — OFFICE VISIT (OUTPATIENT)
Dept: OPHTHALMOLOGY | Facility: CLINIC | Age: 59
End: 2024-03-11
Payer: COMMERCIAL

## 2024-03-11 DIAGNOSIS — Z86.69 H/O DETACHED RETINA REPAIR: ICD-10-CM

## 2024-03-11 DIAGNOSIS — H35.30 MYOPIC MACULAR DEGENERATION OF BOTH EYES: ICD-10-CM

## 2024-03-11 DIAGNOSIS — H25.812 COMBINED FORM OF AGE-RELATED CATARACT, LEFT EYE: ICD-10-CM

## 2024-03-11 DIAGNOSIS — Z98.890 H/O DETACHED RETINA REPAIR: ICD-10-CM

## 2024-03-11 DIAGNOSIS — H25.811 COMBINED FORM OF AGE-RELATED CATARACT, RIGHT EYE: Primary | ICD-10-CM

## 2024-03-11 PROCEDURE — 92136 OPHTHALMIC BIOMETRY: CPT | Performed by: OPHTHALMOLOGY

## 2024-03-11 PROCEDURE — 99214 OFFICE O/P EST MOD 30 MIN: CPT | Performed by: OPHTHALMOLOGY

## 2024-03-11 PROCEDURE — 92136 OPHTHALMIC BIOMETRY: CPT | Mod: BILATERAL PROCEDURE | Performed by: OPHTHALMOLOGY

## 2024-03-11 RX ORDER — TROPICAMIDE 10 MG/ML
1 SOLUTION/ DROPS OPHTHALMIC
Status: CANCELLED | OUTPATIENT
Start: 2024-03-11 | End: 2024-03-11

## 2024-03-11 RX ORDER — PHENYLEPHRINE HYDROCHLORIDE 25 MG/ML
1 SOLUTION/ DROPS OPHTHALMIC
Status: CANCELLED | OUTPATIENT
Start: 2024-03-11 | End: 2024-03-11

## 2024-03-11 RX ORDER — CYCLOPENTOLATE HYDROCHLORIDE 10 MG/ML
1 SOLUTION/ DROPS OPHTHALMIC
Status: CANCELLED | OUTPATIENT
Start: 2024-03-11 | End: 2024-03-11

## 2024-03-11 RX ORDER — MOXIFLOXACIN 5 MG/ML
1 SOLUTION/ DROPS OPHTHALMIC 3 TIMES DAILY
Status: CANCELLED | OUTPATIENT
Start: 2024-03-11

## 2024-03-11 ASSESSMENT — CONF VISUAL FIELD
METHOD: COUNTING FINGERS
OS_NORMAL: 1
OD_SUPERIOR_TEMPORAL_RESTRICTION: 0
OD_NORMAL: 1
OS_INFERIOR_TEMPORAL_RESTRICTION: 0
OD_INFERIOR_TEMPORAL_RESTRICTION: 0
OS_SUPERIOR_TEMPORAL_RESTRICTION: 0
OS_INFERIOR_NASAL_RESTRICTION: 0
OD_INFERIOR_NASAL_RESTRICTION: 0
OS_SUPERIOR_NASAL_RESTRICTION: 0
OD_SUPERIOR_NASAL_RESTRICTION: 0

## 2024-03-11 ASSESSMENT — CUP TO DISC RATIO: OD_RATIO: .3

## 2024-03-11 ASSESSMENT — ENCOUNTER SYMPTOMS: EYES NEGATIVE: 1

## 2024-03-11 ASSESSMENT — EXTERNAL EXAM - RIGHT EYE: OD_EXAM: NORMAL

## 2024-03-11 ASSESSMENT — SLIT LAMP EXAM - LIDS
COMMENTS: NORMAL
COMMENTS: NORMAL

## 2024-03-11 ASSESSMENT — REFRACTION_MANIFEST
OS_ADD: +2.50
OS_AXIS: 015
OD_CYLINDER: -0.75
OS_CYLINDER: -0.50
OD_AXIS: 180
OD_SPHERE: -19.00
OD_ADD: +2.50
OS_SPHERE: -18.00

## 2024-03-11 ASSESSMENT — VISUAL ACUITY
OS_BAT_HIGH: 20/100
CORRECTION_TYPE: CONTACTS
OD_PH_CC: 20/200
METHOD: SNELLEN - LINEAR

## 2024-03-11 ASSESSMENT — TONOMETRY
OD_IOP_MMHG: 18
OS_IOP_MMHG: 20
IOP_METHOD: TONOPEN

## 2024-03-11 ASSESSMENT — EXTERNAL EXAM - LEFT EYE: OS_EXAM: NORMAL

## 2024-03-11 NOTE — PROGRESS NOTES
Combined forms of age-related cataract of right eyeH25.811  Visually significant. Pt would like to proceed with surgery.    Visually significant cataract OD. BCVA: 20/70. Symptoms: blurry vision, glare. A change in glasses prescription will not result in significant visual improvement at this time.  Indication for cataract surgery: Input To potentially improve visual acuity and improve quality of life/reduce symptoms.   Based on a comprehensive eye exam performed today, a visually significant cataract appears to be the source of decreased vision, diminished quality of life, and impairment of activities of daily living. Discussed option of cataract surgery vs observation. Patient can no longer function adequately with current best corrected visual acuity and wishes to have cataract surgery at this time. Discussed surgical procedure with patient. As a result of cataract extraction, it is believed that the patient will experience improved vision. Discussed potential risks, benefits, and complications of cataract surgery including but not limited to pain, bleeding, infection, inflammation, edema, increased eye pressure, retinal tear/detachment, lens dislocation, ptosis, iris damage, need for additional surgery, need for glasses after surgery, loss of vision/loss of eye. Patient understands and wishes to proceed. All questions were answered. Will schedule cataract surgery OD. Lenstar done today.  Discussed IOL options (standard monofocal, monofocal with monovision, toric, multifocal). Lens chosen: standard monofocal. Defer/decline toric/multifocal lens at this time. Had thorough discussion with patient re: aim. Discussed that may potentially need glasses for best vision both at distance and at near.    Schedule cataract surgery OD  I personally reviewed the lenstar measurements and will choose the lens accordingly.    Combined form of age-related cataract, left eye  Pt defers dilation OS today. Will assess after CE  OD    H/O detached retina repair  S/p cryo (and possible pneumatic) by Dr. Jarrell    Myopic macular degeneration of both eyes  Refer to retina for clearance prior to CE OD

## 2024-03-13 ENCOUNTER — CLINICAL SUPPORT (OUTPATIENT)
Dept: PRIMARY CARE | Facility: CLINIC | Age: 59
End: 2024-03-13
Payer: COMMERCIAL

## 2024-03-13 DIAGNOSIS — M79.651 PAIN OF RIGHT THIGH: ICD-10-CM

## 2024-03-13 DIAGNOSIS — E55.9 VITAMIN D DEFICIENCY: ICD-10-CM

## 2024-03-13 DIAGNOSIS — E79.0 ABNORMAL BLOOD LEVEL OF URIC ACID: ICD-10-CM

## 2024-03-13 PROCEDURE — 36415 COLL VENOUS BLD VENIPUNCTURE: CPT

## 2024-03-13 PROCEDURE — 82306 VITAMIN D 25 HYDROXY: CPT

## 2024-03-13 PROCEDURE — 84550 ASSAY OF BLOOD/URIC ACID: CPT

## 2024-03-14 DIAGNOSIS — E79.0 ABNORMAL BLOOD LEVEL OF URIC ACID: Primary | ICD-10-CM

## 2024-03-14 LAB
25(OH)D3 SERPL-MCNC: 35 NG/ML (ref 30–100)
URATE SERPL-MCNC: 7.8 MG/DL (ref 2.3–6.7)

## 2024-03-14 NOTE — RESULT ENCOUNTER NOTE
Please call the patient. Vitamin D level is corrected. Continue with current daily dose. Uric acid level remains elevated. This can be related to elevated blood sugar and certain foods. Limit/avoid following foods: Sugary drinks and sweets, high fructose corn syrup, alcohol, organ meats, game meats, certain seafood: including herring, scallops, mussels, codfish, tuna, trout and cindy, red meats: including beef, lamb, pork and [place]; turkey. Stay well-hydrated. Recheck uric acid level in 4 weeks. TY

## 2024-04-08 ENCOUNTER — OFFICE VISIT (OUTPATIENT)
Dept: PRIMARY CARE | Facility: CLINIC | Age: 59
End: 2024-04-08
Payer: COMMERCIAL

## 2024-04-08 DIAGNOSIS — R05.1 ACUTE COUGH: ICD-10-CM

## 2024-04-08 DIAGNOSIS — R53.81 MALAISE: ICD-10-CM

## 2024-04-08 DIAGNOSIS — B33.8 RSV INFECTION: ICD-10-CM

## 2024-04-08 DIAGNOSIS — R09.81 NASAL CONGESTION: ICD-10-CM

## 2024-04-08 DIAGNOSIS — J40 BRONCHITIS: Primary | ICD-10-CM

## 2024-04-08 PROCEDURE — 3044F HG A1C LEVEL LT 7.0%: CPT | Performed by: NURSE PRACTITIONER

## 2024-04-08 PROCEDURE — 99213 OFFICE O/P EST LOW 20 MIN: CPT | Performed by: NURSE PRACTITIONER

## 2024-04-08 PROCEDURE — 3008F BODY MASS INDEX DOCD: CPT | Performed by: NURSE PRACTITIONER

## 2024-04-08 ASSESSMENT — ENCOUNTER SYMPTOMS
STRIDOR: 0
CHOKING: 0
COUGH: 1
SORE THROAT: 0
FEVER: 0
DIARRHEA: 0
PALPITATIONS: 0
EYE DISCHARGE: 0
FACIAL SWELLING: 0
VOICE CHANGE: 0
APPETITE CHANGE: 0
EYE PAIN: 0
SHORTNESS OF BREATH: 0
DIAPHORESIS: 0
COLOR CHANGE: 0
APNEA: 0
WEAKNESS: 0
UNEXPECTED WEIGHT CHANGE: 0
ADENOPATHY: 0
PHOTOPHOBIA: 0
VOMITING: 0
BACK PAIN: 0
EYE REDNESS: 0
DIZZINESS: 0
SINUS PRESSURE: 0
ABDOMINAL PAIN: 0
LIGHT-HEADEDNESS: 0
TROUBLE SWALLOWING: 0
EYE ITCHING: 0
BRUISES/BLEEDS EASILY: 0
WHEEZING: 0
CHILLS: 0
CHEST TIGHTNESS: 0
ACTIVITY CHANGE: 0
NAUSEA: 0
HEADACHES: 0
SINUS PAIN: 0
FATIGUE: 0

## 2024-04-08 NOTE — LETTER
4/8/2024    To whom it may concern:    Ms. Sole Moreno is currently under my medical care. Please excuse her from work. Tentative return to work is 4/15/2024.    Sincerely,    KELLEY Gipson

## 2024-04-08 NOTE — PROGRESS NOTES
Subjective   Patient ID: Sole Moreno is a 59 y.o. female who presents for cough, nasal congestion, and malaise.    HPI   Patient seen via phone* for cough, nasal congestion, and malaise x 10 days. She went to an urgent care on 4/5/24 and tested positive for RSV. She was prescribed Doxycyline, Tessalon pearls, and oral Prednisone (has not started the Prednisone). The patient's cough responds well to Tessalon. Hx of asthma. She has used her Albuterol inhaler only 2-3 x since the onset pf her symptoms. Nasal symptoms respond somewhat to Flonase. The patient would like a work note to stay home for another couple of days for her symptoms to improve further. She has no other symptoms or concerns.     *The patient verbally agrees to the form of visit.     Review of Systems   Constitutional:  Negative for activity change, appetite change, chills, diaphoresis, fatigue, fever and unexpected weight change.        Positive for malaise   HENT:  Positive for congestion. Negative for ear discharge, ear pain, facial swelling, nosebleeds, postnasal drip, sinus pressure, sinus pain, sore throat, tinnitus, trouble swallowing and voice change.    Eyes:  Negative for photophobia, pain, discharge, redness, itching and visual disturbance.   Respiratory:  Positive for cough. Negative for apnea, choking, chest tightness, shortness of breath, wheezing and stridor.    Cardiovascular:  Negative for chest pain, palpitations and leg swelling.   Gastrointestinal:  Negative for abdominal pain, diarrhea, nausea and vomiting.   Musculoskeletal:  Negative for back pain.   Skin:  Negative for color change, pallor and rash.   Neurological:  Negative for dizziness, syncope, weakness, light-headedness and headaches.   Hematological:  Negative for adenopathy. Does not bruise/bleed easily.       Objective   There were no vitals taken for this visit.    Physical Exam    Assessment/Plan     This visit was completed via phone. All issues as above were  discussed and addressed, but no physical exam was performed.     Medications discussed with patient. The patient may hold off on Prednisone since her symptoms have been improving. Take and finish all other medications as prescribed. Advocated rest and proper hydration. General RSV precautions reviewed. Follow up in 1 week or earlier if no further improvement.

## 2024-04-12 ENCOUNTER — APPOINTMENT (OUTPATIENT)
Dept: PRIMARY CARE | Facility: CLINIC | Age: 59
End: 2024-04-12
Payer: COMMERCIAL

## 2024-04-19 ENCOUNTER — OFFICE VISIT (OUTPATIENT)
Dept: PRIMARY CARE | Facility: CLINIC | Age: 59
End: 2024-04-19
Payer: COMMERCIAL

## 2024-04-19 DIAGNOSIS — E79.0 ABNORMAL BLOOD LEVEL OF URIC ACID: ICD-10-CM

## 2024-04-19 PROCEDURE — 84550 ASSAY OF BLOOD/URIC ACID: CPT

## 2024-04-19 PROCEDURE — 36415 COLL VENOUS BLD VENIPUNCTURE: CPT

## 2024-04-20 LAB — URATE SERPL-MCNC: 6.8 MG/DL (ref 2.3–6.7)

## 2024-04-24 ENCOUNTER — OFFICE VISIT (OUTPATIENT)
Dept: OPHTHALMOLOGY | Facility: CLINIC | Age: 59
End: 2024-04-24
Payer: COMMERCIAL

## 2024-04-24 DIAGNOSIS — Z98.890 H/O DETACHED RETINA REPAIR: ICD-10-CM

## 2024-04-24 DIAGNOSIS — Z86.69 HISTORY OF RETINAL DETACHMENT: ICD-10-CM

## 2024-04-24 DIAGNOSIS — H44.2C1: ICD-10-CM

## 2024-04-24 DIAGNOSIS — H44.23 UNCOMPLICATED DEGENERATIVE MYOPIA OF BOTH EYES: Primary | ICD-10-CM

## 2024-04-24 DIAGNOSIS — H43.813 PVD (POSTERIOR VITREOUS DETACHMENT), BOTH EYES: ICD-10-CM

## 2024-04-24 DIAGNOSIS — H35.30 MYOPIC MACULAR DEGENERATION OF BOTH EYES: ICD-10-CM

## 2024-04-24 DIAGNOSIS — Z86.69 H/O DETACHED RETINA REPAIR: ICD-10-CM

## 2024-04-24 PROCEDURE — 92134 CPTRZ OPH DX IMG PST SGM RTA: CPT | Mod: BILATERAL PROCEDURE | Performed by: OPHTHALMOLOGY

## 2024-04-24 PROCEDURE — 99213 OFFICE O/P EST LOW 20 MIN: CPT | Performed by: OPHTHALMOLOGY

## 2024-04-24 ASSESSMENT — PACHYMETRY: OD_CT(UM): 19

## 2024-04-24 ASSESSMENT — TONOMETRY
OS_IOP_MMHG: DEFFERED
IOP_METHOD: GOLDMANN APPLANATION

## 2024-04-24 ASSESSMENT — VISUAL ACUITY
OD_CC: 20/250
METHOD: SNELLEN - LINEAR
OS_CC: 20/50
CORRECTION_TYPE: CONTACTS
OS_CC+: -2

## 2024-04-24 ASSESSMENT — ENCOUNTER SYMPTOMS
NEUROLOGICAL NEGATIVE: 0
CONSTITUTIONAL NEGATIVE: 0
HEMATOLOGIC/LYMPHATIC NEGATIVE: 0
ALLERGIC/IMMUNOLOGIC NEGATIVE: 0
RESPIRATORY NEGATIVE: 0
GASTROINTESTINAL NEGATIVE: 0
MUSCULOSKELETAL NEGATIVE: 0
ENDOCRINE NEGATIVE: 0
EYES NEGATIVE: 1
CARDIOVASCULAR NEGATIVE: 0
PSYCHIATRIC NEGATIVE: 0

## 2024-04-24 ASSESSMENT — CUP TO DISC RATIO: OD_RATIO: .3

## 2024-04-24 ASSESSMENT — EXTERNAL EXAM - LEFT EYE: OS_EXAM: NORMAL

## 2024-04-24 ASSESSMENT — SLIT LAMP EXAM - LIDS
COMMENTS: NORMAL
COMMENTS: NORMAL

## 2024-04-24 ASSESSMENT — EXTERNAL EXAM - RIGHT EYE: OD_EXAM: NORMAL

## 2024-04-24 NOTE — PROGRESS NOTES
Assessment/Plan   Diagnoses and all orders for this visit:  Uncomplicated degenerative myopia of both eyes  -     OCT, Retina - OU - Both Eyes  Myopic macular degeneration of both eyes  History of retinal detachment  H/O detached retina repair  PVD (posterior vitreous detachment), both eyes  Right eye affected by degenerative myopia with retinal detachment    DIAGNOSTIC PROCEDURE DONE (4/24/24)  OCT DONE OD/OS  REASON FOR TEST: will help address and tailor  therapy by detecting subclinical CME SRF     Hi quality OCT  scans obtained  signal good    OCT OD - myopic contour,  Edema, IS/OS Junction Normal  OCT OS - myopic contour, No Edema, IS/OS [place] Normal    additional commnents:    Myopic macular degeneration of both eyes  H/O detached retina repair  S/p cryo (and possible pneumatic) by Dr. Schmitt  - here today for preoperative retinal eval prior to cataract surgery  - no evidence of retinal tear/detachments on exam    - proceeding with cataract surgery is appropriate  - RTC 1 month after cataract surgery    A spectacle prescription was dispensed to be used as needed.  High index.         Cataracts both eyes  - followed by Dr. Jaimes         Combined form of age-related cataract, left eye  Pt defers dilation OS today. Will assess after CE OD

## 2024-04-26 ENCOUNTER — TELEPHONE (OUTPATIENT)
Dept: PRIMARY CARE | Facility: CLINIC | Age: 59
End: 2024-04-26
Payer: COMMERCIAL

## 2024-04-26 DIAGNOSIS — M54.50 LOW BACK PAIN WITHOUT SCIATICA, UNSPECIFIED BACK PAIN LATERALITY, UNSPECIFIED CHRONICITY: ICD-10-CM

## 2024-04-26 RX ORDER — CYCLOBENZAPRINE HCL 5 MG
5 TABLET ORAL 3 TIMES DAILY PRN
Qty: 30 TABLET | Refills: 0 | Status: SHIPPED | OUTPATIENT
Start: 2024-04-26 | End: 2024-06-06 | Stop reason: SDUPTHER

## 2024-04-26 NOTE — TELEPHONE ENCOUNTER
Sole Moreno phoned, [phone]. She is requesting a refill on the following medication which can be sent to the pharmacy below:          cyclobenzaprine (Flexeril) 5 mg tablet [[id number]]    Order Details  Dose: 5 mg Route: oral Frequency: 3 times daily PRN for muscle spasms   Dispense Quantity: 30 tablet Refills: 0          Sig: Take 1 tablet (5 mg) by mouth 3 times a day as needed for muscle spasms.         Start Date: 02/19/24 End Date: --   Written Date: 02/19/24 Rx Expiration Date: 02/18/25        Associated Diagnoses: Low back pain without sciatica, unspecified back pain laterality, unspecified chronicity [M54.50]   Original Order: cyclobenzaprine (Flexeril) 5 mg tablet [[id number]]   Providers    Ordering and Authorizing Provider: QUIN Castillo NPI: [id number]   JOHN #: [id number]   Ordering User: QUIN Castillo          Pharmacy    [facility]/pharmacy #[id number] [address] AT [place] [address]  Phone: [phone]  Fax: [phone]  JOHN #: [id number]

## 2024-05-01 ENCOUNTER — OFFICE VISIT (OUTPATIENT)
Dept: PRIMARY CARE | Facility: CLINIC | Age: 59
End: 2024-05-01
Payer: COMMERCIAL

## 2024-05-01 VITALS
SYSTOLIC BLOOD PRESSURE: 136 MMHG | OXYGEN SATURATION: 96 % | DIASTOLIC BLOOD PRESSURE: 79 MMHG | RESPIRATION RATE: 16 BRPM | WEIGHT: 260 LBS | BODY MASS INDEX: 41.78 KG/M2 | TEMPERATURE: 97.3 F | HEART RATE: 53 BPM | HEIGHT: 66 IN

## 2024-05-01 DIAGNOSIS — E11.9 TYPE 2 DIABETES MELLITUS WITHOUT COMPLICATION, WITHOUT LONG-TERM CURRENT USE OF INSULIN (MULTI): ICD-10-CM

## 2024-05-01 DIAGNOSIS — F41.8 SITUATIONAL ANXIETY: ICD-10-CM

## 2024-05-01 DIAGNOSIS — I10 BENIGN ESSENTIAL HTN: ICD-10-CM

## 2024-05-01 PROCEDURE — 99214 OFFICE O/P EST MOD 30 MIN: CPT | Performed by: NURSE PRACTITIONER

## 2024-05-01 PROCEDURE — 3075F SYST BP GE 130 - 139MM HG: CPT | Performed by: NURSE PRACTITIONER

## 2024-05-01 PROCEDURE — 3008F BODY MASS INDEX DOCD: CPT | Performed by: NURSE PRACTITIONER

## 2024-05-01 PROCEDURE — 3044F HG A1C LEVEL LT 7.0%: CPT | Performed by: NURSE PRACTITIONER

## 2024-05-01 PROCEDURE — 1036F TOBACCO NON-USER: CPT | Performed by: NURSE PRACTITIONER

## 2024-05-01 PROCEDURE — 3078F DIAST BP <80 MM HG: CPT | Performed by: NURSE PRACTITIONER

## 2024-05-01 RX ORDER — GABAPENTIN 100 MG/1
CAPSULE ORAL
Qty: 30 CAPSULE | Refills: 1 | Status: SHIPPED | OUTPATIENT
Start: 2024-05-01 | End: 2025-02-13

## 2024-05-01 RX ORDER — GABAPENTIN 100 MG/1
CAPSULE ORAL
Qty: 30 CAPSULE | Refills: 1 | Status: CANCELLED | OUTPATIENT
Start: 2024-05-01 | End: 2025-02-13

## 2024-05-01 RX ORDER — LISINOPRIL AND HYDROCHLOROTHIAZIDE 12.5; 2 MG/1; MG/1
2 TABLET ORAL DAILY
Qty: 180 TABLET | Refills: 1 | Status: SHIPPED | OUTPATIENT
Start: 2024-05-01 | End: 2025-05-01

## 2024-05-01 RX ORDER — METFORMIN HYDROCHLORIDE 500 MG/1
500 TABLET ORAL
Qty: 180 TABLET | Refills: 3 | Status: SHIPPED | OUTPATIENT
Start: 2024-05-01

## 2024-05-01 ASSESSMENT — ENCOUNTER SYMPTOMS
WHEEZING: 0
BRUISES/BLEEDS EASILY: 0
STRIDOR: 0
ABDOMINAL PAIN: 0
PHOTOPHOBIA: 0
DIARRHEA: 0
SLEEP DISTURBANCE: 0
NERVOUS/ANXIOUS: 0
FATIGUE: 0
CHOKING: 0
APNEA: 0
DIZZINESS: 0
POLYPHAGIA: 0
VOMITING: 0
BLOOD IN STOOL: 0
NECK PAIN: 0
HEMATURIA: 0
ADENOPATHY: 0
JOINT SWELLING: 0
POLYDIPSIA: 0
HEADACHES: 0
FEVER: 0
UNEXPECTED WEIGHT CHANGE: 0
COUGH: 0
CHEST TIGHTNESS: 0
CHILLS: 0
DYSPHORIC MOOD: 0
APPETITE CHANGE: 0
DIAPHORESIS: 0
WEAKNESS: 0
ANAL BLEEDING: 0
PALPITATIONS: 0
SHORTNESS OF BREATH: 0
ACTIVITY CHANGE: 0
NAUSEA: 0

## 2024-05-01 NOTE — PROGRESS NOTES
Subjective   Patient ID: Sole Moreno is a 59 y.o. female who presents for Diabetes and Anxiety (Gabapentin refills).    Diabetes  Pertinent negatives for hypoglycemia include no dizziness, headaches, nervousness/anxiousness or pallor. Pertinent negatives for diabetes include no chest pain, no fatigue, no polydipsia, no polyphagia, no polyuria and no weakness.   Anxiety  Patient reports no chest pain, dizziness, nausea, nervous/anxious behavior, palpitations or shortness of breath.          Patient in office for follow-up on hypertension (controlled); elevated lipids (stable, per last lab check), depression (controlled), asthma (use rescue inhaler </monthly), and situational anxiety (controlled). Hx of CHEN; well-controlled on CPAP. The patient would like to continue on it. Hx of muscle spasms, which respond well to Flerxeril. The patient understands not to take Gabapentin and Flexeril together. No other concerns.     Last A1C: 6.3% (2/24); stable AM fasting glucose levels at home (<130)  BP: controlled    Patient declines statin therapy. Allergic to Aspirin.     Sees ophthalmologist regularly.     Monitors his feet for lesions.     Review of Systems   Constitutional:  Negative for activity change, appetite change, chills, diaphoresis, fatigue, fever and unexpected weight change.   HENT:  Negative for nosebleeds.    Eyes:  Negative for photophobia and visual disturbance.   Respiratory:  Negative for apnea, cough, choking, chest tightness, shortness of breath, wheezing and stridor.    Cardiovascular:  Negative for chest pain, palpitations and leg swelling.   Gastrointestinal:  Negative for abdominal pain, anal bleeding, blood in stool, diarrhea, nausea and vomiting.   Endocrine: Negative for cold intolerance, heat intolerance, polydipsia, polyphagia and polyuria.   Genitourinary:  Negative for hematuria.   Musculoskeletal:  Negative for gait problem, joint swelling and neck pain.        Hx of back muscle spasms  "  Skin:  Negative for pallor.   Neurological:  Negative for dizziness, syncope, weakness and headaches.   Hematological:  Negative for adenopathy. Does not bruise/bleed easily.   Psychiatric/Behavioral:  Negative for dysphoric mood and sleep disturbance. The patient is not nervous/anxious.        Objective   /79   Pulse 53   Temp 36.3 °C (97.3 °F) (Temporal)   Resp 16   Ht 1.683 m (5' 6.25\")   Wt 118 kg (260 lb)   SpO2 96%   BMI 41.65 kg/m²     Physical Exam  Constitutional:       Appearance: Normal appearance.   HENT:      Head: Normocephalic.   Eyes:      Conjunctiva/sclera: Conjunctivae normal.   Cardiovascular:      Rate and Rhythm: Normal rate and regular rhythm.      Pulses: Normal pulses.      Heart sounds: Normal heart sounds.   Pulmonary:      Effort: Pulmonary effort is normal.      Breath sounds: Normal breath sounds.   Musculoskeletal:         General: No tenderness.      Right lower leg: No edema.      Left lower leg: No edema.   Skin:     General: Skin is warm.      Coloration: Skin is not pale.      Findings: No bruising.   Neurological:      General: No focal deficit present.      Mental Status: She is alert.      Gait: Gait normal.   Psychiatric:         Mood and Affect: Mood normal.         Behavior: Behavior normal.         Thought Content: Thought content normal.         Judgment: Judgment normal.       Assessment/Plan     Exam findings reviewed with patient. Medications and labs reviewed. Risk and benefits of statin therapy reviewed. Patient verbalized understanding. Patient will keep monitoring blood pressures and blood sugars at home; she will call the office with outliers or abnormal trends. Follow up in 6 months for physical and fasting lab work or earlier as needed.    Benefits of healthy lifestyle reviewed: low carbohydrates/fat/sugar diet; use lean white instead of red meet; use several servings of fruit and vegetables daily; use whole grain flour instead of white flour " products; cook at home frequently instead of eating out; exercise 30-90 minutes 5 x/week.

## 2024-05-02 ENCOUNTER — APPOINTMENT (OUTPATIENT)
Dept: PRIMARY CARE | Facility: CLINIC | Age: 59
End: 2024-05-02
Payer: COMMERCIAL

## 2024-05-05 DIAGNOSIS — M54.50 LOW BACK PAIN WITHOUT SCIATICA, UNSPECIFIED BACK PAIN LATERALITY, UNSPECIFIED CHRONICITY: ICD-10-CM

## 2024-05-06 RX ORDER — ALBUTEROL SULFATE 90 UG/1
2 AEROSOL, METERED RESPIRATORY (INHALATION) EVERY 4 HOURS PRN
Qty: 8.5 G | Refills: 2 | Status: SHIPPED | OUTPATIENT
Start: 2024-05-06

## 2024-05-16 ENCOUNTER — APPOINTMENT (OUTPATIENT)
Dept: OPHTHALMOLOGY | Facility: CLINIC | Age: 59
End: 2024-05-16
Payer: COMMERCIAL

## 2024-05-28 ENCOUNTER — ANESTHESIA EVENT (OUTPATIENT)
Dept: OPERATING ROOM | Facility: CLINIC | Age: 59
End: 2024-05-28
Payer: COMMERCIAL

## 2024-05-29 ENCOUNTER — HOSPITAL ENCOUNTER (OUTPATIENT)
Facility: CLINIC | Age: 59
Setting detail: OUTPATIENT SURGERY
Discharge: HOME | End: 2024-05-29
Attending: OPHTHALMOLOGY | Admitting: OPHTHALMOLOGY
Payer: COMMERCIAL

## 2024-05-29 ENCOUNTER — ANESTHESIA (OUTPATIENT)
Dept: OPERATING ROOM | Facility: CLINIC | Age: 59
End: 2024-05-29
Payer: COMMERCIAL

## 2024-05-29 VITALS
TEMPERATURE: 98.2 F | RESPIRATION RATE: 16 BRPM | SYSTOLIC BLOOD PRESSURE: 133 MMHG | DIASTOLIC BLOOD PRESSURE: 76 MMHG | OXYGEN SATURATION: 97 % | HEART RATE: 63 BPM

## 2024-05-29 DIAGNOSIS — H25.811 COMBINED FORM OF AGE-RELATED CATARACT, RIGHT EYE: Primary | ICD-10-CM

## 2024-05-29 LAB
GLUCOSE BLD MANUAL STRIP-MCNC: 129 MG/DL (ref 74–99)
POC FINGERSTICK BLOOD GLUCOSE: 129 MG/DL (ref 70–100)

## 2024-05-29 PROCEDURE — C1780 LENS, INTRAOCULAR (NEW TECH): HCPCS | Performed by: OPHTHALMOLOGY

## 2024-05-29 PROCEDURE — 7100000009 HC PHASE TWO TIME - INITIAL BASE CHARGE: Performed by: OPHTHALMOLOGY

## 2024-05-29 PROCEDURE — 2500000005 HC RX 250 GENERAL PHARMACY W/O HCPCS: Mod: SE | Performed by: STUDENT IN AN ORGANIZED HEALTH CARE EDUCATION/TRAINING PROGRAM

## 2024-05-29 PROCEDURE — 66984 XCAPSL CTRC RMVL W/O ECP: CPT | Performed by: OPHTHALMOLOGY

## 2024-05-29 PROCEDURE — 2500000005 HC RX 250 GENERAL PHARMACY W/O HCPCS: Mod: SE | Performed by: OPHTHALMOLOGY

## 2024-05-29 PROCEDURE — 82962 GLUCOSE BLOOD TEST: CPT | Performed by: OPHTHALMOLOGY

## 2024-05-29 PROCEDURE — 2500000004 HC RX 250 GENERAL PHARMACY W/ HCPCS (ALT 636 FOR OP/ED): Mod: SE | Performed by: NURSE ANESTHETIST, CERTIFIED REGISTERED

## 2024-05-29 PROCEDURE — 3700000001 HC GENERAL ANESTHESIA TIME - INITIAL BASE CHARGE: Performed by: OPHTHALMOLOGY

## 2024-05-29 PROCEDURE — 2720000007 HC OR 272 NO HCPCS: Performed by: OPHTHALMOLOGY

## 2024-05-29 PROCEDURE — 82947 ASSAY GLUCOSE BLOOD QUANT: CPT | Mod: 59

## 2024-05-29 PROCEDURE — 3600000008 HC OR TIME - EACH INCREMENTAL 1 MINUTE - PROCEDURE LEVEL THREE: Performed by: OPHTHALMOLOGY

## 2024-05-29 PROCEDURE — 3700000002 HC GENERAL ANESTHESIA TIME - EACH INCREMENTAL 1 MINUTE: Performed by: OPHTHALMOLOGY

## 2024-05-29 PROCEDURE — 2500000001 HC RX 250 WO HCPCS SELF ADMINISTERED DRUGS (ALT 637 FOR MEDICARE OP): Mod: SE | Performed by: OPHTHALMOLOGY

## 2024-05-29 PROCEDURE — 2500000004 HC RX 250 GENERAL PHARMACY W/ HCPCS (ALT 636 FOR OP/ED): Mod: SE | Performed by: OPHTHALMOLOGY

## 2024-05-29 PROCEDURE — 3600000003 HC OR TIME - INITIAL BASE CHARGE - PROCEDURE LEVEL THREE: Performed by: OPHTHALMOLOGY

## 2024-05-29 PROCEDURE — 7100000010 HC PHASE TWO TIME - EACH INCREMENTAL 1 MINUTE: Performed by: OPHTHALMOLOGY

## 2024-05-29 DEVICE — IMPLANTABLE DEVICE: Type: IMPLANTABLE DEVICE | Site: EYE | Status: FUNCTIONAL

## 2024-05-29 RX ORDER — ONDANSETRON HYDROCHLORIDE 2 MG/ML
4 INJECTION, SOLUTION INTRAVENOUS ONCE AS NEEDED
Status: DISCONTINUED | OUTPATIENT
Start: 2024-05-29 | End: 2024-05-29 | Stop reason: HOSPADM

## 2024-05-29 RX ORDER — SODIUM CHLORIDE, SODIUM LACTATE, POTASSIUM CHLORIDE, CALCIUM CHLORIDE 600; 310; 30; 20 MG/100ML; MG/100ML; MG/100ML; MG/100ML
100 INJECTION, SOLUTION INTRAVENOUS CONTINUOUS
Status: DISCONTINUED | OUTPATIENT
Start: 2024-05-29 | End: 2024-05-29 | Stop reason: HOSPADM

## 2024-05-29 RX ORDER — CYCLOPENTOLATE HYDROCHLORIDE 10 MG/ML
1 SOLUTION/ DROPS OPHTHALMIC
Status: COMPLETED | OUTPATIENT
Start: 2024-05-29 | End: 2024-05-29

## 2024-05-29 RX ORDER — TETRACAINE HYDROCHLORIDE 5 MG/ML
1 SOLUTION OPHTHALMIC ONCE
Status: COMPLETED | OUTPATIENT
Start: 2024-05-29 | End: 2024-05-29

## 2024-05-29 RX ORDER — MOXIFLOXACIN 5 MG/ML
1 SOLUTION/ DROPS OPHTHALMIC 3 TIMES DAILY
Status: DISCONTINUED | OUTPATIENT
Start: 2024-05-29 | End: 2024-05-29 | Stop reason: HOSPADM

## 2024-05-29 RX ORDER — TRIAMCINOLONE ACETONIDE 40 MG/ML
INJECTION, SUSPENSION INTRA-ARTICULAR; INTRAMUSCULAR AS NEEDED
Status: DISCONTINUED | OUTPATIENT
Start: 2024-05-29 | End: 2024-05-29 | Stop reason: HOSPADM

## 2024-05-29 RX ORDER — LIDOCAINE HYDROCHLORIDE 10 MG/ML
INJECTION INFILTRATION; PERINEURAL AS NEEDED
Status: DISCONTINUED | OUTPATIENT
Start: 2024-05-29 | End: 2024-05-29 | Stop reason: HOSPADM

## 2024-05-29 RX ORDER — TROPICAMIDE 10 MG/ML
1 SOLUTION/ DROPS OPHTHALMIC
Status: COMPLETED | OUTPATIENT
Start: 2024-05-29 | End: 2024-05-29

## 2024-05-29 RX ORDER — MIDAZOLAM HYDROCHLORIDE 1 MG/ML
INJECTION, SOLUTION INTRAMUSCULAR; INTRAVENOUS AS NEEDED
Status: DISCONTINUED | OUTPATIENT
Start: 2024-05-29 | End: 2024-05-29

## 2024-05-29 RX ORDER — PHENYLEPHRINE HYDROCHLORIDE 25 MG/ML
1 SOLUTION/ DROPS OPHTHALMIC
Status: COMPLETED | OUTPATIENT
Start: 2024-05-29 | End: 2024-05-29

## 2024-05-29 RX ORDER — MOXIFLOXACIN 5 MG/ML
SOLUTION/ DROPS OPHTHALMIC AS NEEDED
Status: DISCONTINUED | OUTPATIENT
Start: 2024-05-29 | End: 2024-05-29 | Stop reason: HOSPADM

## 2024-05-29 RX ORDER — LIDOCAINE IN NACL,ISO-OSMOT/PF 30 MG/3 ML
0.1 SYRINGE (ML) INJECTION ONCE
Status: DISCONTINUED | OUTPATIENT
Start: 2024-05-29 | End: 2024-05-29 | Stop reason: HOSPADM

## 2024-05-29 RX ORDER — ACETAMINOPHEN 325 MG/1
TABLET ORAL AS NEEDED
Status: DISCONTINUED | OUTPATIENT
Start: 2024-05-29 | End: 2024-05-29

## 2024-05-29 RX ADMIN — TROPICAMIDE 1 DROP: 10 SOLUTION/ DROPS OPHTHALMIC at 07:35

## 2024-05-29 RX ADMIN — CYCLOPENTOLATE HYDROCHLORIDE 1 DROP: 10 SOLUTION/ DROPS OPHTHALMIC at 07:25

## 2024-05-29 RX ADMIN — TETRACAINE HYDROCHLORIDE 1 DROP: 5 SOLUTION/ DROPS OPHTHALMIC at 07:15

## 2024-05-29 RX ADMIN — CYCLOPENTOLATE HYDROCHLORIDE 1 DROP: 10 SOLUTION/ DROPS OPHTHALMIC at 07:15

## 2024-05-29 RX ADMIN — PHENYLEPHRINE HYDROCHLORIDE 1 DROP: 25 SOLUTION/ DROPS OPHTHALMIC at 07:35

## 2024-05-29 RX ADMIN — MIDAZOLAM 2 MG: 1 INJECTION INTRAMUSCULAR; INTRAVENOUS at 07:41

## 2024-05-29 RX ADMIN — MOXIFLOXACIN 1 DROP: 5 SOLUTION/ DROPS OPHTHALMIC at 07:15

## 2024-05-29 RX ADMIN — PHENYLEPHRINE HYDROCHLORIDE 1 DROP: 25 SOLUTION/ DROPS OPHTHALMIC at 07:25

## 2024-05-29 RX ADMIN — TROPICAMIDE 1 DROP: 10 SOLUTION/ DROPS OPHTHALMIC at 07:15

## 2024-05-29 RX ADMIN — TROPICAMIDE 1 DROP: 10 SOLUTION/ DROPS OPHTHALMIC at 07:25

## 2024-05-29 RX ADMIN — PHENYLEPHRINE HYDROCHLORIDE 1 DROP: 25 SOLUTION/ DROPS OPHTHALMIC at 07:15

## 2024-05-29 RX ADMIN — ACETAMINOPHEN 650 MG: 325 TABLET ORAL at 07:28

## 2024-05-29 RX ADMIN — CYCLOPENTOLATE HYDROCHLORIDE 1 DROP: 10 SOLUTION/ DROPS OPHTHALMIC at 07:35

## 2024-05-29 SDOH — HEALTH STABILITY: MENTAL HEALTH: CURRENT SMOKER: 0

## 2024-05-29 ASSESSMENT — ENCOUNTER SYMPTOMS
CONSTITUTIONAL NEGATIVE: 1
GASTROINTESTINAL NEGATIVE: 1
CARDIOVASCULAR NEGATIVE: 1
PSYCHIATRIC NEGATIVE: 1
RESPIRATORY NEGATIVE: 1

## 2024-05-29 ASSESSMENT — PAIN SCALES - GENERAL
PAINLEVEL_OUTOF10: 0 - NO PAIN
PAINLEVEL_OUTOF10: 0 - NO PAIN

## 2024-05-29 ASSESSMENT — COLUMBIA-SUICIDE SEVERITY RATING SCALE - C-SSRS
2. HAVE YOU ACTUALLY HAD ANY THOUGHTS OF KILLING YOURSELF?: NO
1. IN THE PAST MONTH, HAVE YOU WISHED YOU WERE DEAD OR WISHED YOU COULD GO TO SLEEP AND NOT WAKE UP?: NO
6. HAVE YOU EVER DONE ANYTHING, STARTED TO DO ANYTHING, OR PREPARED TO DO ANYTHING TO END YOUR LIFE?: NO

## 2024-05-29 ASSESSMENT — PAIN - FUNCTIONAL ASSESSMENT
PAIN_FUNCTIONAL_ASSESSMENT: 0-10
PAIN_FUNCTIONAL_ASSESSMENT: 0-10

## 2024-05-29 NOTE — ANESTHESIA POSTPROCEDURE EVALUATION
Patient: Sole Moreno    Procedure Summary       Date: 05/29/24 Room / [facility] 02 [facility]    Anesthesia Start: 0732 Anesthesia Stop: 0813    Procedure: Cataract extraction with intraocular lens implantation OD (Right) Diagnosis:       Combined form of age-related cataract, right eye      (Combined form of age-related cataract, right eye [H25.811])    Surgeons: Ty Jaimes MD Responsible Provider: Bashir Arndt MD    Anesthesia Type: MAC ASA Status: 2            Anesthesia Type: MAC    Vitals Value Taken Time   /75 05/29/24 0813   Temp 36.7 05/29/24 0813   Pulse 64 05/29/24 0813   Resp 18 05/29/24 0813   SpO2 98 05/29/24 0813       Anesthesia Post Evaluation    Patient location during evaluation: bedside  Patient participation: complete - patient participated  Level of consciousness: awake and alert  Pain management: adequate  Airway patency: patent  Cardiovascular status: acceptable  Respiratory status: acceptable and room air  Hydration status: acceptable  Postoperative Nausea and Vomiting: none        No notable events documented.

## 2024-05-29 NOTE — H&P
History Of Present Illness  Sole Moreno is a 59 y.o. female presenting with right eye cataract. Here for right eye cataract extraction and intraocular lens placement.     Past Medical History  Past Medical History:   Diagnosis Date    Abnormal levels of other serum enzymes 04/19/2018    Elevated liver enzymes    Abnormal levels of other serum enzymes 04/19/2018    Elevated liver enzymes    Acute bronchitis, unspecified 04/19/2018    Acute bronchitis    Acute bronchitis, unspecified 04/19/2018    Acute bronchitis    Dorsalgia, unspecified 04/19/2018    Chronic back pain    Dorsalgia, unspecified 04/19/2018    Chronic back pain    Encounter for screening mammogram for malignant neoplasm of breast 04/19/2018    Encounter for screening mammogram for breast cancer    Encounter for screening mammogram for malignant neoplasm of breast 04/19/2018    Encounter for screening mammogram for breast cancer    Enlarged lymph nodes, unspecified 04/19/2018    Swollen lymph nodes    Enlarged lymph nodes, unspecified 04/19/2018    Swollen lymph nodes    Essential (primary) hypertension 04/19/2018    Benign essential HTN    Essential (primary) hypertension 04/19/2018    Benign essential HTN    Myopia, bilateral 08/18/2016    High myopia, bilateral    Other hypertrophic disorders of the skin 04/19/2018    Cutaneous skin tags    Other hypertrophic disorders of the skin 04/19/2018    Cutaneous skin tags    Other specified anxiety disorders 04/19/2018    Depression with anxiety    Other specified anxiety disorders 04/19/2018    Depression with anxiety    Other specified anxiety disorders 10/11/2022    Situational anxiety    Otitis media, unspecified, unspecified ear 04/19/2018    Acute otitis media    Otitis media, unspecified, unspecified ear 04/19/2018    Acute otitis media    Pain in right hip 04/19/2018    Hip pain, chronic, right    Pain in right hip 04/19/2018    Hip pain, chronic, right    Pain in right knee 04/19/2018    Right  knee pain    Pain in right knee 2018    Right knee pain    Personal history of other endocrine, nutritional and metabolic disease 2015    History of hypokalemia    Personal history of other medical treatment 2018    History of screening mammography    Psoriasis, unspecified 2018    Psoriasis    Psoriasis, unspecified 2018    Psoriasis    Pure hypercholesterolemia, unspecified 2018    High cholesterol    Pure hypercholesterolemia, unspecified 2018    High cholesterol    Radiculopathy, cervical region 2018    Cervical radiculopathy    Radiculopathy, cervical region 2018    Cervical radiculopathy    Scoliosis, unspecified 2018    Scoliosis    Scoliosis, unspecified 2018    Scoliosis    Sleep apnea, unspecified 2018    Sleep apnea    Sleep apnea, unspecified 2018    Sleep apnea    Spondylosis, unspecified 2018    Arthritis of back    Spondylosis, unspecified 2018    Arthritis of back    Type 2 diabetes mellitus without complications (Multi) 2018    Diabetes mellitus, type II    Type 2 diabetes mellitus without complications (Multi) 2018    Diabetes mellitus, type II    Unspecified asthma, uncomplicated ([facility]) 2018    Asthmatic bronchitis    Unspecified asthma, uncomplicated ([facility]) 2018    Asthma    Unspecified asthma, uncomplicated ([facility]) 2018    Asthmatic bronchitis    Unspecified asthma, uncomplicated ([facility]) 2018    Asthma    Unspecified cataract 2016    Cataract of right eye    Unspecified cataract 2016    Cataract of left eye    Unspecified nonsuppurative otitis media, unspecified ear 2018    Otitis, serous    Unspecified nonsuppurative otitis media, unspecified ear 2020    Otitis, serous    Vitamin D deficiency, unspecified 2018    Vitamin D deficiency       Surgical History  Past Surgical History:   Procedure Laterality Date     SECTION,  CLASSIC  2014     Section    GALLBLADDER SURGERY  2014    Gallbladder Surgery    KNEE ARTHROSCOPY W/ MENISCAL REPAIR  2014    Knee Arthroscopy With Lateral Meniscus Repair    RETINAL DETACHMENT SURGERY Right 2010        Social History  She reports that she quit smoking about 21 years ago. Her smoking use included cigarettes. She has never used smokeless tobacco. She reports current alcohol use. She reports that she does not use drugs.    Family History  Family History   Problem Relation Name Age of Onset    Hypertension Mother      Osteoporosis Mother      Other (Cerebrovascular accident (CVA)) Father      Multiple sclerosis Sibling      Glaucoma Neg Hx      Macular degeneration Neg Hx          Allergies  Aspirin, Penicillins, and Erythromycin    Review of Systems   Constitutional: Negative.    Respiratory: Negative.     Cardiovascular: Negative.    Gastrointestinal: Negative.    Psychiatric/Behavioral: Negative.          Physical Exam  Constitutional:       Appearance: Normal appearance.   HENT:      Head: Normocephalic and atraumatic.   Cardiovascular:      Rate and Rhythm: Regular rhythm.   Pulmonary:      Effort: Pulmonary effort is normal.   Abdominal:      General: Abdomen is flat.      Palpations: Abdomen is soft.   Neurological:      Mental Status: She is alert.          Last Recorded Vitals  There were no vitals taken for this visit.    Relevant Results        right eye cataract. Here for right eye cataract extraction and intraocular lens placement.     Assessment/Plan   Principal Problem:    Combined form of age-related cataract, right eye      right eye cataract. Here for right eye cataract extraction and intraocular lens placement.           Obie Cano MD

## 2024-05-29 NOTE — DISCHARGE INSTRUCTIONS
Dropless surgery - no need for post-op eyedrops  Keep shield on eye until follow-up tomorrow, then continue to wear at bedtime for 2 weeks  Avoid eye rubbing  Avoid getting water and soap in the eye. Okay to shower tomorrow.  Avoid doing activities where you bend lower than your heart.  Avoid lifting greater than 10-15 lbs  Follow-up tomorrow at [place] at 3:45PM    May have Tylenol after: 1:20PM    May have Ibuprofen/advil/motrin/aleve after: if needed

## 2024-05-29 NOTE — OP NOTE
Cataract extraction with intraocular lens implantation OD (R) Operative Note     Date: 2024  [facility]    Name: Sole Moreno, : 1965, Age: 59 y.o., MRN: [id number], Sex: female    Diagnosis  Pre-op Diagnosis     * Combined form of age-related cataract, right eye [H25.811] Post-op Diagnosis     * Combined form of age-related cataract, right eye [H25.811]     Procedures  Cataract extraction with intraocular [place]  67355 - RI XCAPSL CTRC RMVL INSJ IO LENS PROSTH W/O ECP      Surgeons      * Ty Jaimes - Primary    Resident/Fellow/Other Assistant:  Surgeons and Role:  * No surgeons found with a matching role *    Procedure Summary  Anesthesia: Monitor Anesthesia Care  ASA: II  Anesthesia Staff: Anesthesiologist: Bashir Arndt MD  CRNA: FRANCK Whalen-CRNA  Estimated Blood Loss: 0mL  Intra-op Medications:   Administrations occurring from 0730 to 0810 on 24:   Medication Name Total Dose   balanced salts (BSS) intraocular solution 100 mL   lidocaine (Xylocaine) 10 mg/mL (1 %) injection 1 mL   moxifloxacin (Vigamox) 0.5 % ophthalmic solution 1 drop   triamcinolone acetonide (Kenalog-40) injection 40 mg   chondroitin sulf-sod hyaluron (Viscoat) intraocular injection 0.75 mL   sodium hyaluronate (Healon) intraocular injection 0.85 mg   cyclopentolate (Cyclogyl) 1 % ophthalmic solution 1 drop 1 drop   phenylephrine (Mydfrin) 2.5 % ophthalmic solution 1 drop 1 drop   tropicamide (Mydriacyl) 1 % ophthalmic solution 1 drop 1 drop              Anesthesia Record               Intraprocedure I/O Totals       None           Specimen: No specimens collected     Staff:   Circulator: Kaylee Luna Person: Mariaelena  Scrub Person: Brendan         Drains and/or Catheters: * None in log *    Tourniquet Times:         Implants:  Implants       Type Name Action Serial No.      Lens expand series acrysof IOL Implanted [id number]              Findings: Cataract  OS    Indications: Sole Moreno is an 59 y.o. female who is having surgery for Combined form of age-related cataract, right eye [H25.811].     The patient was seen in the preoperative area. The risks, benefits, complications, treatment options, non-operative alternatives, expected recovery and outcomes were discussed with the patient. The possibilities of reaction to medication, pulmonary aspiration, injury to surrounding structures, bleeding, recurrent infection, the need for additional procedures, failure to diagnose a condition, and creating a complication requiring transfusion or operation were discussed with the patient. The patient concurred with the proposed plan, giving informed consent.  The site of surgery was properly noted/marked if necessary per policy. The patient has been actively warmed in preoperative area. Preoperative antibiotics have been ordered and given within 1 hours of incision. Venous thrombosis prophylaxis are not indicated.    Procedure Details: The patient was placed in the supine position on the operating room table where appropriate blood pressure and cardiac monitoring were initiated. The patient was prepped and draped in the usual sterile fashion for intraocular surgery. This included instillation of Betadine 5% onto the ocular surface followed by irrigation with balance salt solution a minute or two later. A lid speculum was placed and the operating microscope was positioned. One paracentesis stab incision was made to the left of the planned cataract incision with a 15-degree supersharp blade. 1 ml of preservative free lidocaine was injected into the AC. Viscoat was used to replace the aqueous humor. A temporal clear corneal wound was fashioned beginning at the limbus with a 2.2 mm keratome, extending 2 mm into clear cornea before entering the anterior chamber. A continuous tear circular capsulorhexis of approximately 5 mm in diameter was performed. Hydrodissection was performed  using a Guido canula. The endothelium was coated with viscoat again. Using the Ozil handpiece on the Trak Lens Removal System, the nucleus was emulsified and aspirated using a divide-and-conquer technique. Residual cortex was removed from the eye with the irrigation/aspiration bimanually. ProVisc was used to inflate the capsular bag. The lens implant was inspected and found to be free of visible defects. The lens used was an Tor model [id number], power -1.0  diopter intraocular lens. The lens was inserted into the capsular bag using the [place] insertion mechanism. The lens was centered with a Sheikh hook. Residual viscoelastic was removed from the eye with the irrigation/aspiration instrument. Preservative free moxifloxacin was injected  intracameral. The wounds were checked and found to be watertight. 0.3ml of Diluted (1:3) triamcinolone acetonide was injected subonj inferiorly. The lid speculum was removed and the eye was dressed with Maxitrol ointment, eye pad, tape, and shield. The patient tolerated the procedure well, and there were no complications.   Complications:  None; patient tolerated the procedure well.    Disposition: PACU - hemodynamically stable.  Condition: stable         Additional Details: None    Attending Attestation: I performed the procedure.    Ty Jaimes  Phone Number: [phone]

## 2024-05-29 NOTE — BRIEF OP NOTE
Date: 2024  [facility]    Name: Sole Moreno, : 1965, Age: 59 y.o., MRN: [id number], Sex: female    Diagnosis  Pre-op Diagnosis     * Combined form of age-related cataract, right eye [H25.811] Post-op Diagnosis     * Combined form of age-related cataract, right eye [H25.811]     Procedures  Cataract extraction with intraocular [place] implantation OD  88620 - [place] XCAPSL CTRC RMVL INSJ IO LENS PROSTH W/O ECP      Surgeons      * Ty Jaimes - Primary    Resident/Fellow/Other Assistant:  Surgeons and Role:  * No surgeons found with a matching role *    Procedure Summary  Anesthesia: Monitor Anesthesia Care  ASA: II  Anesthesia Staff: Anesthesiologist: Bashir Arndt MD  CRNA: FRANCK Whalen-CRNA  Estimated Blood Loss: <5mL  Intra-op Medications:   Administrations occurring from 0730 to 0810 on 24:   Medication Name Total Dose   balanced salts (BSS) intraocular solution 100 mL   lidocaine (Xylocaine) 10 mg/mL (1 %) injection 1 mL   moxifloxacin (Vigamox) 0.5 % ophthalmic solution 1 drop   triamcinolone acetonide (Kenalog-40) injection 40 mg   chondroitin sulf-sod hyaluron (Viscoat) intraocular injection 0.75 mL   sodium hyaluronate (Healon) intraocular injection 0.85 mg   cyclopentolate (Cyclogyl) 1 % ophthalmic solution 1 drop 1 drop   phenylephrine (Mydfrin) 2.5 % ophthalmic solution 1 drop 1 drop   tropicamide (Mydriacyl) 1 % ophthalmic solution 1 drop 1 drop              Anesthesia Record               Intraprocedure I/O Totals       None           Specimen: No specimens collected     Staff:   Circulator: Kaylee Whiteub Person: Mariaelena  Scrub Person: Brendan          Findings: Right eye cataract extraction and intraocular lens placement. PCIOL in Bag.    Complications:  None; patient tolerated the procedure well.     Disposition: PACU - hemodynamically stable.  Condition: stable  Specimens Collected: No specimens collected  Attending Attestation: I was present and  scrubbed for the entire procedure.    Ty Jaimes  Phone Number: [phone]

## 2024-05-29 NOTE — ANESTHESIA PREPROCEDURE EVALUATION
Patient: Sole Moreno    Procedure Information       Date/Time: 05/29/24 0730    Procedure: Cataract extraction with intraocular lens implantation OD (Right)    Location: [facility] OR 02 / [facility]    Surgeons: Ty Jaimes MD            Relevant Problems   Anesthesia (within normal limits)      Cardiac   (+) Benign essential HTN   (+) HTN (hypertension)   (+) Mitral regurgitation   (+) Pure hypercholesterolemia      Pulmonary   (+) Asthma (HHS-HCC)   (+) Obstructive sleep apnea syndrome      Neuro   (+) Anxiety with flying   (+) Depression with anxiety   (+) Depressive disorder   (+) Situational anxiety      GI (within normal limits)      /Renal   (+) Kidney stone      Liver   (+) Acute pancreatitis (HHS-HCC)   (+) Gallstone      Endocrine   (+) Diabetes mellitus, type II (Multi)   (+) Obesity      Hematology (within normal limits)      Musculoskeletal   (+) Chronic bilateral low back pain without sciatica   (+) Scoliosis       Clinical information reviewed:   Tobacco  Allergies  Meds   Med Hx  Surg Hx   Fam Hx  Soc Hx        NPO Detail:  No data recorded     Physical Exam    Airway  Mallampati: II  TM distance: >3 FB  Neck ROM: full     Cardiovascular    Dental    Pulmonary   Breath sounds clear to auscultation     Abdominal          Anesthesia Plan    History of general anesthesia?: yes  History of complications of general anesthesia?: no    ASA 2     MAC     The patient is not a current smoker.  Patient did not smoke on day of procedure.    intravenous induction   Anesthetic plan and risks discussed with patient.  Use of blood products discussed with patient who consented to blood products.    Plan discussed with resident.

## 2024-05-30 ENCOUNTER — OFFICE VISIT (OUTPATIENT)
Dept: OPHTHALMOLOGY | Facility: CLINIC | Age: 59
End: 2024-05-30
Payer: COMMERCIAL

## 2024-05-30 DIAGNOSIS — H25.811 COMBINED FORM OF AGE-RELATED CATARACT, RIGHT EYE: Primary | ICD-10-CM

## 2024-05-30 PROCEDURE — 99024 POSTOP FOLLOW-UP VISIT: CPT | Performed by: OPHTHALMOLOGY

## 2024-05-30 RX ORDER — PREDNISOLONE ACETATE 10 MG/ML
1 SUSPENSION/ DROPS OPHTHALMIC 4 TIMES DAILY
Qty: 5 ML | Refills: 1 | Status: SHIPPED | OUTPATIENT
Start: 2024-05-30 | End: 2024-06-29

## 2024-05-30 ASSESSMENT — EXTERNAL EXAM - LEFT EYE: OS_EXAM: NORMAL

## 2024-05-30 ASSESSMENT — TONOMETRY
OS_IOP_MMHG: CTL IN
IOP_METHOD: GOLDMANN APPLANATION
OD_IOP_MMHG: 26

## 2024-05-30 ASSESSMENT — VISUAL ACUITY
METHOD: SNELLEN - LINEAR
OD_PH_SC: 20/40
OS_CC: 20/50
OD_SC: 20/50

## 2024-05-30 ASSESSMENT — SLIT LAMP EXAM - LIDS
COMMENTS: NORMAL
COMMENTS: NORMAL

## 2024-05-30 ASSESSMENT — ENCOUNTER SYMPTOMS: EYES NEGATIVE: 1

## 2024-05-30 ASSESSMENT — PAIN SCALES - GENERAL: PAINLEVEL_OUTOF10: 0 - NO PAIN

## 2024-05-30 ASSESSMENT — EXTERNAL EXAM - RIGHT EYE: OD_EXAM: NORMAL

## 2024-05-30 NOTE — PATIENT INSTRUCTIONS
Postop instructions   Prednisolone acetate drops:  4 times/day for 1 week  3 times/day for 1 week  2 times/day for 1 week  Once/day for 1 week      Sleep with shield at night for 7 days  No eye rubbing  May shower and wash your face but no water inside surgery eye  No lifting any weight above 10lbs

## 2024-06-06 DIAGNOSIS — M54.50 LOW BACK PAIN WITHOUT SCIATICA, UNSPECIFIED BACK PAIN LATERALITY, UNSPECIFIED CHRONICITY: ICD-10-CM

## 2024-06-06 RX ORDER — CYCLOBENZAPRINE HCL 5 MG
5 TABLET ORAL 3 TIMES DAILY PRN
Qty: 30 TABLET | Refills: 0 | Status: SHIPPED | OUTPATIENT
Start: 2024-06-06

## 2024-06-24 ENCOUNTER — APPOINTMENT (OUTPATIENT)
Dept: OPHTHALMOLOGY | Facility: CLINIC | Age: 59
End: 2024-06-24
Payer: COMMERCIAL

## 2024-06-24 DIAGNOSIS — H40.041 STEROID RESPONDERS TO GLAUCOMA OF RIGHT EYE: Primary | ICD-10-CM

## 2024-06-24 PROCEDURE — 99024 POSTOP FOLLOW-UP VISIT: CPT | Performed by: OPHTHALMOLOGY

## 2024-06-24 RX ORDER — BRIMONIDINE TARTRATE 2 MG/ML
1 SOLUTION/ DROPS OPHTHALMIC 2 TIMES DAILY
Qty: 5 ML | Refills: 0 | Status: SHIPPED | OUTPATIENT
Start: 2024-06-24 | End: 2024-07-24

## 2024-06-24 ASSESSMENT — TONOMETRY
IOP_METHOD: GOLDMANN APPLANATION
OD_IOP_MMHG: 26

## 2024-06-24 ASSESSMENT — REFRACTION_MANIFEST
OD_SPHERE: +1.00
OD_AXIS: 020
OD_CYLINDER: -0.75

## 2024-06-24 ASSESSMENT — ENCOUNTER SYMPTOMS
EYES NEGATIVE: 1
ALLERGIC/IMMUNOLOGIC NEGATIVE: 0
HEMATOLOGIC/LYMPHATIC NEGATIVE: 0
NEUROLOGICAL NEGATIVE: 0
MUSCULOSKELETAL NEGATIVE: 0
CARDIOVASCULAR NEGATIVE: 0
RESPIRATORY NEGATIVE: 0
GASTROINTESTINAL NEGATIVE: 0
ENDOCRINE NEGATIVE: 0
CONSTITUTIONAL NEGATIVE: 0
PSYCHIATRIC NEGATIVE: 0

## 2024-06-24 ASSESSMENT — VISUAL ACUITY
OD_SC: 20/50
OS_CC: 20/50
METHOD: SNELLEN - LINEAR

## 2024-06-24 ASSESSMENT — SLIT LAMP EXAM - LIDS
COMMENTS: NORMAL
COMMENTS: NORMAL

## 2024-06-24 ASSESSMENT — EXTERNAL EXAM - LEFT EYE: OS_EXAM: NORMAL

## 2024-06-24 ASSESSMENT — EXTERNAL EXAM - RIGHT EYE: OD_EXAM: NORMAL

## 2024-06-24 NOTE — PROGRESS NOTES
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    6 weeks for IOP check  Pt would like to wait for CE OS

## 2024-06-26 ENCOUNTER — APPOINTMENT (OUTPATIENT)
Dept: OPHTHALMOLOGY | Facility: CLINIC | Age: 59
End: 2024-06-26
Payer: COMMERCIAL

## 2024-07-18 DIAGNOSIS — H40.041 STEROID RESPONDERS TO GLAUCOMA OF RIGHT EYE: ICD-10-CM

## 2024-07-25 ENCOUNTER — TELEPHONE (OUTPATIENT)
Dept: PRIMARY CARE | Facility: CLINIC | Age: 59
End: 2024-07-25
Payer: COMMERCIAL

## 2024-07-25 DIAGNOSIS — M54.50 LOW BACK PAIN WITHOUT SCIATICA, UNSPECIFIED BACK PAIN LATERALITY, UNSPECIFIED CHRONICITY: ICD-10-CM

## 2024-07-25 RX ORDER — CYCLOBENZAPRINE HCL 5 MG
5 TABLET ORAL 3 TIMES DAILY PRN
Qty: 30 TABLET | Refills: 0 | Status: SHIPPED | OUTPATIENT
Start: 2024-07-25

## 2024-07-25 NOTE — TELEPHONE ENCOUNTER
Sole Josh phoned, [phone]. She is requesting a refill on the following medication:      cyclobenzaprine (Flexeril) 5 mg tablet [[id number]]    Order Details  Dose: 5 mg Route: oral Frequency: 3 times daily PRN for muscle spasms   Dispense Quantity: 30 tablet Refills: 0    Pharmacy    [facility]/pharmacy #[id number] [address] AT [place] [id number]  Phone: [phone]  Fax: [phone]  JOHN #: [id number]

## 2024-08-08 ENCOUNTER — APPOINTMENT (OUTPATIENT)
Dept: OPHTHALMOLOGY | Facility: CLINIC | Age: 59
End: 2024-08-08
Payer: COMMERCIAL

## 2024-08-15 ENCOUNTER — APPOINTMENT (OUTPATIENT)
Dept: OPHTHALMOLOGY | Facility: CLINIC | Age: 59
End: 2024-08-15
Payer: COMMERCIAL

## 2024-08-15 DIAGNOSIS — H40.041 STEROID RESPONDERS TO GLAUCOMA OF RIGHT EYE: Primary | ICD-10-CM

## 2024-08-15 PROCEDURE — 99024 POSTOP FOLLOW-UP VISIT: CPT | Performed by: OPHTHALMOLOGY

## 2024-08-15 RX ORDER — BRIMONIDINE TARTRATE 2 MG/ML
1 SOLUTION/ DROPS OPHTHALMIC 2 TIMES DAILY
Qty: 10 ML | Refills: 1 | Status: SHIPPED | OUTPATIENT
Start: 2024-08-15 | End: 2025-08-15

## 2024-08-15 ASSESSMENT — ENCOUNTER SYMPTOMS
EYES NEGATIVE: 1
GASTROINTESTINAL NEGATIVE: 0
HEMATOLOGIC/LYMPHATIC NEGATIVE: 0
ENDOCRINE NEGATIVE: 0
RESPIRATORY NEGATIVE: 0
CARDIOVASCULAR NEGATIVE: 0
CONSTITUTIONAL NEGATIVE: 0
ALLERGIC/IMMUNOLOGIC NEGATIVE: 0
MUSCULOSKELETAL NEGATIVE: 0
PSYCHIATRIC NEGATIVE: 0
NEUROLOGICAL NEGATIVE: 0

## 2024-08-15 ASSESSMENT — TONOMETRY
OS_IOP_MMHG: CL
OD_IOP_MMHG: 25
IOP_METHOD: GOLDMANN APPLANATION

## 2024-08-15 ASSESSMENT — VISUAL ACUITY
OD_SC+: +2
OS_CC: 20/50
OD_SC: 20/50
METHOD: SNELLEN - LINEAR

## 2024-08-15 ASSESSMENT — EXTERNAL EXAM - LEFT EYE: OS_EXAM: NORMAL

## 2024-08-15 ASSESSMENT — SLIT LAMP EXAM - LIDS
COMMENTS: NORMAL
COMMENTS: NORMAL

## 2024-08-15 ASSESSMENT — EXTERNAL EXAM - RIGHT EYE: OD_EXAM: NORMAL

## 2024-08-15 NOTE — PROGRESS NOTES
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    2 months for IOP check

## 2024-09-05 ENCOUNTER — TELEPHONE (OUTPATIENT)
Dept: PRIMARY CARE | Facility: CLINIC | Age: 59
End: 2024-09-05
Payer: COMMERCIAL

## 2024-09-05 DIAGNOSIS — E11.9 TYPE 2 DIABETES MELLITUS WITHOUT COMPLICATION, WITHOUT LONG-TERM CURRENT USE OF INSULIN (MULTI): ICD-10-CM

## 2024-09-05 RX ORDER — METFORMIN HYDROCHLORIDE 500 MG/1
500 TABLET ORAL
Qty: 180 TABLET | Refills: 1 | Status: SHIPPED | OUTPATIENT
Start: 2024-09-05

## 2024-09-05 NOTE — TELEPHONE ENCOUNTER
Sole Josh phoned, [phone].  Sole is requesting a refill on the following:    metFORMIN (Glucophage) 500 mg tablet [[id number]]    Order Details  Dose: 500 mg Route: oral Frequency: 2 times daily (morning and late afternoon)   Dispense Quantity: 180 tablet Refills: 3          Sig: Take 1 tablet (500 mg) by mouth 2 times a day with meals. Take with food.         Start Date: 05/01/24 End Date: --   Written Date: 05/01/24 Rx Expiration Date: 05/01/25        Associated Diagnoses: Type 2 diabetes mellitus without complication, without long-term current use of insulin (Multi) [E11.9]   Original Order: metFORMIN (Glucophage) 500 mg tablet [[id number]]     Pharmacy    [facility]/pharmacy #[id number] [address] AT [place] [id number]  Phone: [phone]  Fax: [phone]  JOHN #: [id number]

## 2024-09-16 ENCOUNTER — APPOINTMENT (OUTPATIENT)
Dept: OPHTHALMOLOGY | Facility: CLINIC | Age: 59
End: 2024-09-16
Payer: COMMERCIAL

## 2024-09-16 DIAGNOSIS — H35.30 MYOPIC MACULAR DEGENERATION OF BOTH EYES: Primary | ICD-10-CM

## 2024-09-16 DIAGNOSIS — H44.2C1: ICD-10-CM

## 2024-09-16 DIAGNOSIS — H44.2A3 BOTH EYES AFFECTED BY DEGENERATIVE MYOPIA WITH CHOROIDAL NEOVASCULARIZATION: ICD-10-CM

## 2024-09-16 DIAGNOSIS — H43.813 PVD (POSTERIOR VITREOUS DETACHMENT), BOTH EYES: ICD-10-CM

## 2024-09-16 PROCEDURE — 92134 CPTRZ OPH DX IMG PST SGM RTA: CPT | Performed by: OPHTHALMOLOGY

## 2024-09-16 PROCEDURE — 99214 OFFICE O/P EST MOD 30 MIN: CPT | Performed by: OPHTHALMOLOGY

## 2024-09-16 ASSESSMENT — ENCOUNTER SYMPTOMS
GASTROINTESTINAL NEGATIVE: 0
RESPIRATORY NEGATIVE: 0
PSYCHIATRIC NEGATIVE: 0
ENDOCRINE NEGATIVE: 0
CONSTITUTIONAL NEGATIVE: 0
HEMATOLOGIC/LYMPHATIC NEGATIVE: 0
MUSCULOSKELETAL NEGATIVE: 0
EYES NEGATIVE: 1
CARDIOVASCULAR NEGATIVE: 1
ALLERGIC/IMMUNOLOGIC NEGATIVE: 0
NEUROLOGICAL NEGATIVE: 0

## 2024-09-16 ASSESSMENT — VISUAL ACUITY
METHOD: SNELLEN - LINEAR
OS_CC: 20/40
OD_SC: 20/40

## 2024-09-16 ASSESSMENT — CONF VISUAL FIELD
OS_SUPERIOR_NASAL_RESTRICTION: 0
METHOD: COUNTING FINGERS
OD_INFERIOR_NASAL_RESTRICTION: 0
OD_INFERIOR_TEMPORAL_RESTRICTION: 0
OS_SUPERIOR_TEMPORAL_RESTRICTION: 0
OS_NORMAL: 1
OD_SUPERIOR_NASAL_RESTRICTION: 0
OS_INFERIOR_NASAL_RESTRICTION: 0
OS_INFERIOR_TEMPORAL_RESTRICTION: 0
OD_SUPERIOR_TEMPORAL_RESTRICTION: 0
OD_NORMAL: 1

## 2024-09-16 ASSESSMENT — EXTERNAL EXAM - RIGHT EYE: OD_EXAM: NORMAL

## 2024-09-16 ASSESSMENT — TONOMETRY: OD_IOP_MMHG: 24

## 2024-09-16 ASSESSMENT — CUP TO DISC RATIO
OD_RATIO: 0.2
OS_RATIO: 0.2

## 2024-09-16 ASSESSMENT — EXTERNAL EXAM - LEFT EYE: OS_EXAM: NORMAL

## 2024-09-16 ASSESSMENT — SLIT LAMP EXAM - LIDS
COMMENTS: NORMAL
COMMENTS: NORMAL

## 2024-09-16 NOTE — PROGRESS NOTES
Assessment/Plan   Diagnoses and all orders for this visit:  Myopic macular degeneration of both eyes  -     OCT, Retina - OU - Both Eyes  Right eye affected by degenerative myopia with retinal detachment  PVD (posterior vitreous detachment), both eyes  Both eyes affected by degenerative myopia with choroidal neovascularization      DIAGNOSTIC PROCEDURE DONE    OCT DONE OD/OS            REASON FOR TEST: will help address and tailor  therapy by detecting subclinical CME SRF     Hi quality OCT  scans obtained  signal good    OCT OD - abnormal Foveal Contour, No Edema, IS/OS Junction Normal ARF  OCT OS - abnormal Foveal Contour, No Edema, IS/OS Junction Normal    additional commnents:    Intraocular pressure (IOP)  persistently high see Dr Jaimes          Today she ha s good visual acuity (VA) Srf OD would watch she had subretinal fluid (SRF) in 4 2024 also right  No change    Fu 2months  May need anti VEGF

## 2024-10-08 ENCOUNTER — TELEPHONE (OUTPATIENT)
Dept: PRIMARY CARE | Facility: CLINIC | Age: 59
End: 2024-10-08
Payer: COMMERCIAL

## 2024-10-08 DIAGNOSIS — M54.50 LOW BACK PAIN WITHOUT SCIATICA, UNSPECIFIED BACK PAIN LATERALITY, UNSPECIFIED CHRONICITY: ICD-10-CM

## 2024-10-08 RX ORDER — CYCLOBENZAPRINE HCL 5 MG
5 TABLET ORAL 3 TIMES DAILY PRN
Qty: 30 TABLET | Refills: 0 | Status: SHIPPED | OUTPATIENT
Start: 2024-10-08

## 2024-10-17 ENCOUNTER — APPOINTMENT (OUTPATIENT)
Dept: OPHTHALMOLOGY | Facility: CLINIC | Age: 59
End: 2024-10-17
Payer: COMMERCIAL

## 2024-10-17 DIAGNOSIS — H40.041 STEROID RESPONDERS TO GLAUCOMA OF RIGHT EYE: Primary | ICD-10-CM

## 2024-10-17 DIAGNOSIS — H25.812 COMBINED FORM OF AGE-RELATED CATARACT, LEFT EYE: ICD-10-CM

## 2024-10-17 PROCEDURE — 99214 OFFICE O/P EST MOD 30 MIN: CPT | Performed by: OPHTHALMOLOGY

## 2024-10-17 ASSESSMENT — ENCOUNTER SYMPTOMS
CONSTITUTIONAL NEGATIVE: 0
EYES NEGATIVE: 1
GASTROINTESTINAL NEGATIVE: 0
ALLERGIC/IMMUNOLOGIC NEGATIVE: 0
PSYCHIATRIC NEGATIVE: 0
NEUROLOGICAL NEGATIVE: 0
RESPIRATORY NEGATIVE: 0
MUSCULOSKELETAL NEGATIVE: 0
ENDOCRINE NEGATIVE: 0
HEMATOLOGIC/LYMPHATIC NEGATIVE: 0
CARDIOVASCULAR NEGATIVE: 0

## 2024-10-17 ASSESSMENT — SLIT LAMP EXAM - LIDS
COMMENTS: NORMAL
COMMENTS: NORMAL

## 2024-10-17 ASSESSMENT — VISUAL ACUITY
OS_PH_CC: 20/40
OD_SC: 20/50
METHOD: SNELLEN - LINEAR

## 2024-10-17 ASSESSMENT — TONOMETRY
IOP_METHOD: GOLDMANN APPLANATION
OD_IOP_MMHG: 28
OS_IOP_MMHG: 20

## 2024-10-17 ASSESSMENT — EXTERNAL EXAM - RIGHT EYE: OD_EXAM: NORMAL

## 2024-10-17 ASSESSMENT — EXTERNAL EXAM - LEFT EYE: OS_EXAM: NORMAL

## 2024-10-17 ASSESSMENT — REFRACTION_MANIFEST
OD_AXIS: 015
OD_CYLINDER: -0.75
OD_SPHERE: +1.00

## 2024-10-17 NOTE — PROGRESS NOTES
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    Combined form of age-related cataract, left eye  Not Visually significant  Monitor    2 months for pachymetry, HVF, OCT RNFL and IOP check

## 2024-11-01 ENCOUNTER — APPOINTMENT (OUTPATIENT)
Dept: PRIMARY CARE | Facility: CLINIC | Age: 59
End: 2024-11-01
Payer: COMMERCIAL

## 2024-11-01 VITALS
HEART RATE: 62 BPM | OXYGEN SATURATION: 98 % | DIASTOLIC BLOOD PRESSURE: 73 MMHG | WEIGHT: 264 LBS | HEIGHT: 66 IN | BODY MASS INDEX: 42.43 KG/M2 | TEMPERATURE: 97.4 F | RESPIRATION RATE: 16 BRPM | SYSTOLIC BLOOD PRESSURE: 116 MMHG

## 2024-11-01 DIAGNOSIS — E11.9 TYPE 2 DIABETES MELLITUS WITHOUT COMPLICATION, WITHOUT LONG-TERM CURRENT USE OF INSULIN (MULTI): ICD-10-CM

## 2024-11-01 DIAGNOSIS — J45.20 MILD INTERMITTENT ASTHMA, UNSPECIFIED WHETHER COMPLICATED (HHS-HCC): ICD-10-CM

## 2024-11-01 DIAGNOSIS — F41.8 SITUATIONAL ANXIETY: ICD-10-CM

## 2024-11-01 DIAGNOSIS — Z12.31 ENCOUNTER FOR SCREENING MAMMOGRAM FOR BREAST CANCER: ICD-10-CM

## 2024-11-01 DIAGNOSIS — I10 BENIGN ESSENTIAL HTN: ICD-10-CM

## 2024-11-01 DIAGNOSIS — E55.9 VITAMIN D DEFICIENCY: ICD-10-CM

## 2024-11-01 DIAGNOSIS — M54.50 LOW BACK PAIN WITHOUT SCIATICA, UNSPECIFIED BACK PAIN LATERALITY, UNSPECIFIED CHRONICITY: ICD-10-CM

## 2024-11-01 DIAGNOSIS — Z00.00 HEALTHCARE MAINTENANCE: Primary | ICD-10-CM

## 2024-11-01 DIAGNOSIS — E11.9 TYPE 2 DIABETES MELLITUS WITHOUT COMPLICATION, UNSPECIFIED WHETHER LONG TERM INSULIN USE (MULTI): ICD-10-CM

## 2024-11-01 LAB
25(OH)D3 SERPL-MCNC: 28 NG/ML (ref 30–100)
ALBUMIN SERPL BCP-MCNC: 4.2 G/DL (ref 3.4–5)
ALP SERPL-CCNC: 81 U/L (ref 33–136)
ALT SERPL W P-5'-P-CCNC: 29 U/L (ref 7–45)
ANION GAP SERPL CALC-SCNC: 10 MMOL/L (ref 10–20)
AST SERPL W P-5'-P-CCNC: 16 U/L (ref 9–39)
BASOPHILS # BLD AUTO: 0.04 X10*3/UL (ref 0–0.1)
BASOPHILS NFR BLD AUTO: 0.6 %
BILIRUB SERPL-MCNC: 0.6 MG/DL (ref 0–1.2)
BUN SERPL-MCNC: 14 MG/DL (ref 6–23)
CALCIUM SERPL-MCNC: 9.9 MG/DL (ref 8.6–10.6)
CHLORIDE SERPL-SCNC: 102 MMOL/L (ref 98–107)
CHOLEST SERPL-MCNC: 178 MG/DL (ref 0–199)
CHOLESTEROL/HDL RATIO: 3.6
CO2 SERPL-SCNC: 29 MMOL/L (ref 21–32)
CREAT SERPL-MCNC: 0.76 MG/DL (ref 0.5–1.05)
EGFRCR SERPLBLD CKD-EPI 2021: 89 ML/MIN/1.73M*2
EOSINOPHIL # BLD AUTO: 0.17 X10*3/UL (ref 0–0.7)
EOSINOPHIL NFR BLD AUTO: 2.5 %
ERYTHROCYTE [DISTWIDTH] IN BLOOD BY AUTOMATED COUNT: 15.1 % (ref 11.5–14.5)
GLUCOSE SERPL-MCNC: 121 MG/DL (ref 74–99)
HCT VFR BLD AUTO: 37.8 % (ref 36–46)
HDLC SERPL-MCNC: 49.4 MG/DL
HGB BLD-MCNC: 11.9 G/DL (ref 12–16)
IMM GRANULOCYTES # BLD AUTO: 0.02 X10*3/UL (ref 0–0.7)
IMM GRANULOCYTES NFR BLD AUTO: 0.3 % (ref 0–0.9)
LDLC SERPL CALC-MCNC: 101 MG/DL
LYMPHOCYTES # BLD AUTO: 1.79 X10*3/UL (ref 1.2–4.8)
LYMPHOCYTES NFR BLD AUTO: 26.7 %
MCH RBC QN AUTO: 26 PG (ref 26–34)
MCHC RBC AUTO-ENTMCNC: 31.5 G/DL (ref 32–36)
MCV RBC AUTO: 83 FL (ref 80–100)
MONOCYTES # BLD AUTO: 0.38 X10*3/UL (ref 0.1–1)
MONOCYTES NFR BLD AUTO: 5.7 %
NEUTROPHILS # BLD AUTO: 4.31 X10*3/UL (ref 1.2–7.7)
NEUTROPHILS NFR BLD AUTO: 64.2 %
NON HDL CHOLESTEROL: 129 MG/DL (ref 0–149)
NRBC BLD-RTO: 0 /100 WBCS (ref 0–0)
PLATELET # BLD AUTO: 278 X10*3/UL (ref 150–450)
POTASSIUM SERPL-SCNC: 4.1 MMOL/L (ref 3.5–5.3)
PROT SERPL-MCNC: 6.4 G/DL (ref 6.4–8.2)
RBC # BLD AUTO: 4.57 X10*6/UL (ref 4–5.2)
SODIUM SERPL-SCNC: 137 MMOL/L (ref 136–145)
TRIGL SERPL-MCNC: 139 MG/DL (ref 0–149)
TSH SERPL-ACNC: 1.61 MIU/L (ref 0.44–3.98)
VLDL: 28 MG/DL (ref 0–40)
WBC # BLD AUTO: 6.7 X10*3/UL (ref 4.4–11.3)

## 2024-11-01 PROCEDURE — 84443 ASSAY THYROID STIM HORMONE: CPT

## 2024-11-01 PROCEDURE — 80061 LIPID PANEL: CPT

## 2024-11-01 PROCEDURE — 83036 HEMOGLOBIN GLYCOSYLATED A1C: CPT

## 2024-11-01 PROCEDURE — 3044F HG A1C LEVEL LT 7.0%: CPT | Performed by: NURSE PRACTITIONER

## 2024-11-01 PROCEDURE — 82306 VITAMIN D 25 HYDROXY: CPT

## 2024-11-01 PROCEDURE — 99214 OFFICE O/P EST MOD 30 MIN: CPT | Performed by: NURSE PRACTITIONER

## 2024-11-01 PROCEDURE — 1036F TOBACCO NON-USER: CPT | Performed by: NURSE PRACTITIONER

## 2024-11-01 PROCEDURE — 3074F SYST BP LT 130 MM HG: CPT | Performed by: NURSE PRACTITIONER

## 2024-11-01 PROCEDURE — 80053 COMPREHEN METABOLIC PANEL: CPT

## 2024-11-01 PROCEDURE — 3008F BODY MASS INDEX DOCD: CPT | Performed by: NURSE PRACTITIONER

## 2024-11-01 PROCEDURE — 3078F DIAST BP <80 MM HG: CPT | Performed by: NURSE PRACTITIONER

## 2024-11-01 PROCEDURE — 85025 COMPLETE CBC W/AUTO DIFF WBC: CPT

## 2024-11-01 RX ORDER — GABAPENTIN 100 MG/1
CAPSULE ORAL
Qty: 30 CAPSULE | Refills: 1 | Status: CANCELLED | OUTPATIENT
Start: 2024-11-01 | End: 2025-08-16

## 2024-11-01 RX ORDER — BLOOD SUGAR DIAGNOSTIC
STRIP MISCELLANEOUS
Qty: 100 EACH | Refills: 3 | Status: SHIPPED | OUTPATIENT
Start: 2024-11-01

## 2024-11-01 RX ORDER — CYCLOBENZAPRINE HCL 5 MG
5 TABLET ORAL 3 TIMES DAILY PRN
Qty: 30 TABLET | Refills: 3 | Status: CANCELLED | OUTPATIENT
Start: 2024-11-01

## 2024-11-01 RX ORDER — BLOOD SUGAR DIAGNOSTIC
STRIP MISCELLANEOUS
Qty: 100 EACH | Refills: 3 | Status: CANCELLED | OUTPATIENT
Start: 2024-11-01

## 2024-11-01 RX ORDER — LISINOPRIL AND HYDROCHLOROTHIAZIDE 12.5; 2 MG/1; MG/1
2 TABLET ORAL DAILY
Qty: 180 TABLET | Refills: 1 | Status: CANCELLED | OUTPATIENT
Start: 2024-11-01 | End: 2025-11-01

## 2024-11-01 RX ORDER — METFORMIN HYDROCHLORIDE 500 MG/1
500 TABLET ORAL
Qty: 180 TABLET | Refills: 1 | Status: CANCELLED | OUTPATIENT
Start: 2024-11-01

## 2024-11-02 LAB
EST. AVERAGE GLUCOSE BLD GHB EST-MCNC: 140 MG/DL
HBA1C MFR BLD: 6.5 %

## 2024-11-15 DIAGNOSIS — M54.50 LOW BACK PAIN WITHOUT SCIATICA, UNSPECIFIED BACK PAIN LATERALITY, UNSPECIFIED CHRONICITY: ICD-10-CM

## 2024-11-15 DIAGNOSIS — E11.9 TYPE 2 DIABETES MELLITUS WITHOUT COMPLICATION, WITHOUT LONG-TERM CURRENT USE OF INSULIN (MULTI): ICD-10-CM

## 2024-11-15 RX ORDER — METFORMIN HYDROCHLORIDE 500 MG/1
500 TABLET ORAL
Qty: 180 TABLET | Refills: 0 | Status: SHIPPED | OUTPATIENT
Start: 2024-11-15

## 2024-11-15 RX ORDER — CYCLOBENZAPRINE HCL 5 MG
5 TABLET ORAL 3 TIMES DAILY PRN
Qty: 30 TABLET | Refills: 0 | Status: SHIPPED | OUTPATIENT
Start: 2024-11-15

## 2024-11-18 ENCOUNTER — APPOINTMENT (OUTPATIENT)
Dept: OPHTHALMOLOGY | Facility: CLINIC | Age: 59
End: 2024-11-18
Payer: COMMERCIAL

## 2024-11-18 ENCOUNTER — OFFICE VISIT (OUTPATIENT)
Dept: OPHTHALMOLOGY | Facility: CLINIC | Age: 59
End: 2024-11-18
Payer: COMMERCIAL

## 2024-11-18 DIAGNOSIS — H35.053 CHOROIDAL RETINAL NEOVASCULARIZATION, BILATERAL: ICD-10-CM

## 2024-11-18 DIAGNOSIS — H44.2C1: ICD-10-CM

## 2024-11-18 DIAGNOSIS — H43.813 PVD (POSTERIOR VITREOUS DETACHMENT), BOTH EYES: ICD-10-CM

## 2024-11-18 DIAGNOSIS — H35.30 MYOPIC MACULAR DEGENERATION OF BOTH EYES: ICD-10-CM

## 2024-11-18 DIAGNOSIS — H40.041 STEROID RESPONDERS TO GLAUCOMA OF RIGHT EYE: Primary | ICD-10-CM

## 2024-11-18 PROCEDURE — 92134 CPTRZ OPH DX IMG PST SGM RTA: CPT | Mod: BILATERAL PROCEDURE | Performed by: OPHTHALMOLOGY

## 2024-11-18 PROCEDURE — 99213 OFFICE O/P EST LOW 20 MIN: CPT | Performed by: OPHTHALMOLOGY

## 2024-11-18 ASSESSMENT — VISUAL ACUITY
METHOD: SNELLEN - LINEAR
OD_SC: 20/50-2

## 2024-11-18 ASSESSMENT — SLIT LAMP EXAM - LIDS
COMMENTS: NORMAL
COMMENTS: NORMAL

## 2024-11-18 ASSESSMENT — EXTERNAL EXAM - RIGHT EYE: OD_EXAM: NORMAL

## 2024-11-18 ASSESSMENT — CUP TO DISC RATIO
OD_RATIO: .3
OS_RATIO: .3

## 2024-11-18 ASSESSMENT — ENCOUNTER SYMPTOMS
CARDIOVASCULAR NEGATIVE: 0
EYES NEGATIVE: 1
PSYCHIATRIC NEGATIVE: 0
ALLERGIC/IMMUNOLOGIC NEGATIVE: 0
CONSTITUTIONAL NEGATIVE: 0
MUSCULOSKELETAL NEGATIVE: 0
HEMATOLOGIC/LYMPHATIC NEGATIVE: 0
ENDOCRINE NEGATIVE: 0
GASTROINTESTINAL NEGATIVE: 0
RESPIRATORY NEGATIVE: 0
NEUROLOGICAL NEGATIVE: 0

## 2024-11-18 ASSESSMENT — EXTERNAL EXAM - LEFT EYE: OS_EXAM: NORMAL

## 2024-11-18 ASSESSMENT — TONOMETRY
OD_IOP_MMHG: 24
IOP_METHOD: GOLDMANN APPLANATION

## 2024-11-18 NOTE — PROGRESS NOTES
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  Choroidal retinal neovascularization, bilateral      DIAGNOSTIC PROCEDURE DONE    OCT DONE OD/OS            REASON FOR TEST: will help address and tailor  therapy by detecting subclinical CME SRF     Hi quality OCT  scans obtained  signal good    OCT OD - abnormal Foveal Contour, No Edema, IS/OS Junction Normal subretinal fluid (SRF) mild  OCT OS - abnormal Foveal Contour, No Edema, IS/OS Junction Normal subretinal fluid (SRF) mild    additional commnents:    Intraocular pressure (IOP)  persistently high see Dr Jaimes          Today she ha s good visual acuity (VA) Srf OD would watch she had subretinal fluid (SRF) in 4 2024 also right  No change    Fu 3 months  May need anti VEGF soon      She may get better visual acuity (VA)  w refraction/ will ask Dr CAMPBELL

## 2024-12-19 ENCOUNTER — APPOINTMENT (OUTPATIENT)
Dept: OPHTHALMOLOGY | Facility: CLINIC | Age: 59
End: 2024-12-19
Payer: COMMERCIAL

## 2024-12-19 DIAGNOSIS — H25.812 COMBINED FORM OF AGE-RELATED CATARACT, LEFT EYE: ICD-10-CM

## 2024-12-19 DIAGNOSIS — H35.30 MYOPIC MACULAR DEGENERATION OF BOTH EYES: ICD-10-CM

## 2024-12-19 DIAGNOSIS — H40.041 STEROID RESPONDERS TO GLAUCOMA OF RIGHT EYE: Primary | ICD-10-CM

## 2024-12-19 LAB
AVERAGE RNFL TODAY (OD): 62
AVERAGE RNFL TODAY (OS): 90

## 2024-12-19 PROCEDURE — 76514 ECHO EXAM OF EYE THICKNESS: CPT | Performed by: OPHTHALMOLOGY

## 2024-12-19 PROCEDURE — 92133 CPTRZD OPH DX IMG PST SGM ON: CPT | Performed by: OPHTHALMOLOGY

## 2024-12-19 PROCEDURE — 99214 OFFICE O/P EST MOD 30 MIN: CPT | Performed by: OPHTHALMOLOGY

## 2024-12-19 PROCEDURE — 92083 EXTENDED VISUAL FIELD XM: CPT | Performed by: OPHTHALMOLOGY

## 2024-12-19 ASSESSMENT — ENCOUNTER SYMPTOMS
MUSCULOSKELETAL NEGATIVE: 0
CONSTITUTIONAL NEGATIVE: 0
NEUROLOGICAL NEGATIVE: 0
EYES NEGATIVE: 1
ALLERGIC/IMMUNOLOGIC NEGATIVE: 0
RESPIRATORY NEGATIVE: 0
PSYCHIATRIC NEGATIVE: 0
HEMATOLOGIC/LYMPHATIC NEGATIVE: 0
CARDIOVASCULAR NEGATIVE: 0
GASTROINTESTINAL NEGATIVE: 0
ENDOCRINE NEGATIVE: 0

## 2024-12-19 ASSESSMENT — REFRACTION_MANIFEST
OD_AXIS: 010
OD_CYLINDER: -1.00
OD_SPHERE: +1.50
OD_ADD: +2.75

## 2024-12-19 ASSESSMENT — SLIT LAMP EXAM - LIDS
COMMENTS: NORMAL
COMMENTS: NORMAL

## 2024-12-19 ASSESSMENT — TONOMETRY
OD_IOP_MMHG: 25
OS_IOP_MMHG: 24
IOP_METHOD: GOLDMANN APPLANATION

## 2024-12-19 ASSESSMENT — VISUAL ACUITY
METHOD: SNELLEN - LINEAR
OD_SC: 20/50-2

## 2024-12-19 ASSESSMENT — PACHYMETRY
OD_CT(UM): 536
OS_CT(UM): 535

## 2024-12-19 ASSESSMENT — EXTERNAL EXAM - RIGHT EYE: OD_EXAM: NORMAL

## 2024-12-19 ASSESSMENT — EXTERNAL EXAM - LEFT EYE: OS_EXAM: NORMAL

## 2024-12-19 NOTE — Clinical Note
Dariel: I am sending you this patient to see whether or not she has glaucoma. Her IOP OD went up after phaco done in 5/2024 most likely 2/2 SC kenalog. Now I do not see any kenalog but her IOP is slightly high OU. Sorry, I did all the scans today to see if I could get any answers. If she does not have glaucoma, I can monitor her pressure.

## 2024-12-19 NOTE — PROGRESS NOTES
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 Crawford for glaucoma eval    Myopic macular degeneration of both eyes  Managed by Dr. Weber  Combined form of age-related cataract, left eye  Not Visually significant  Monitor

## 2024-12-20 ENCOUNTER — TELEPHONE (OUTPATIENT)
Dept: PRIMARY CARE | Facility: CLINIC | Age: 59
End: 2024-12-20
Payer: COMMERCIAL

## 2024-12-20 DIAGNOSIS — M54.50 LOW BACK PAIN WITHOUT SCIATICA, UNSPECIFIED BACK PAIN LATERALITY, UNSPECIFIED CHRONICITY: ICD-10-CM

## 2024-12-20 DIAGNOSIS — H40.041 STEROID RESPONDERS TO GLAUCOMA OF RIGHT EYE: Primary | ICD-10-CM

## 2024-12-20 RX ORDER — CYCLOBENZAPRINE HCL 5 MG
5 TABLET ORAL 3 TIMES DAILY PRN
Qty: 30 TABLET | Refills: 1 | Status: SHIPPED | OUTPATIENT
Start: 2024-12-20

## 2024-12-20 RX ORDER — DORZOLAMIDE HYDROCHLORIDE AND TIMOLOL MALEATE 20; 5 MG/ML; MG/ML
1 SOLUTION/ DROPS OPHTHALMIC 2 TIMES DAILY
Qty: 10 ML | Refills: 1 | Status: SHIPPED | OUTPATIENT
Start: 2024-12-20 | End: 2025-12-20

## 2024-12-20 NOTE — TELEPHONE ENCOUNTER
Sole Moreno phoned, [phone]. She is requesting the following medication refill:    cyclobenzaprine (Flexeril) 5 mg tablet [[id number]]    Order Details  Dose: 5 mg Route: oral Frequency: 3 times daily PRN for muscle spasms   Dispense Quantity: 30 tablet Refills: 0          Sig: Take 1 tablet (5 mg) by mouth 3 times a day as needed for muscle spasms.         Start Date: 11/15/24 End Date: --   Written Date: 11/15/24 Rx Expiration Date: 11/15/25        Associated Diagnoses: Low back pain without sciatica, unspecified back pain laterality, unspecified chronicity [M54.50]   Original Order: cyclobenzaprine (Flexeril) 5 mg tablet [[id number]]   Providers    Ordering and Authorizing Provider: QUIN Castillo NPI: [id number]   JOHN #: [id number]   Ordering User: QUIN Castillo        Pharmacy    [facility]/pharmacy #[id number] [address] AT [place] [address]  Phone: [phone]  Fax: [phone]

## 2025-01-08 DIAGNOSIS — H40.041 STEROID RESPONDERS TO GLAUCOMA OF RIGHT EYE: ICD-10-CM

## 2025-01-08 RX ORDER — BRIMONIDINE TARTRATE 2 MG/ML
SOLUTION/ DROPS OPHTHALMIC
Qty: 10 ML | Refills: 1 | OUTPATIENT
Start: 2025-01-08

## 2025-01-14 ENCOUNTER — TELEPHONE (OUTPATIENT)
Dept: PRIMARY CARE | Facility: CLINIC | Age: 60
End: 2025-01-14
Payer: COMMERCIAL

## 2025-01-14 DIAGNOSIS — I10 BENIGN ESSENTIAL HTN: ICD-10-CM

## 2025-01-14 RX ORDER — LISINOPRIL AND HYDROCHLOROTHIAZIDE 12.5; 2 MG/1; MG/1
2 TABLET ORAL DAILY
Qty: 180 TABLET | Refills: 1 | Status: SHIPPED | OUTPATIENT
Start: 2025-01-14

## 2025-01-14 NOTE — PROGRESS NOTES
1/15/2025 CC: 59 y.o. presents for glaucoma evaluation, referred by Dr Jaimes    Past ocular history: RD OD, Pseudophakia OD  Family history: no family history of glaucoma   Past medical history: Asthma (seasonal), HTN, DM , others per chart    Social history: denies tobacco     Eye medications:   Right eye: Cosopt bid     Allergy:  PCN, Emycin, Aspirin    Testing:    HVF 24-2 12/19/2024: OD- GD, paracentral scatters, S Scatters, ? early NS, MD -6.6. OS- GD, I scatters, MD -7.3 dB    OCT 1/15/2025: OD- thinb I/S, avg 71. OS- thin I, bdl S, avg 74 um.    Pachymetry 1/15/2025: 536/535    Gonioscopy 1/15/2025: open OU    Tmax:    Assessment:   POAG, mild OD>OS  - Negative fhx  - Average C  - Tilted asymmetrical disc  - Testing: OCT thin I OU, HVF: ? NS OD  - Possible steroid response  - Uses Cosopt bid OD for a month (well tolerated).  Target IOP: teens OU  -IOP 1/15/2025: 15/20. Will continue Cosopt bid OD and start it OS. Recheck IOP 1 mo, offer SLT.     -Pseudophakia OD  -s/p PCIOL 5/29/2024 Dr Jaimes    H/O detached retina repair  S/p cryo (and possible pneumatic) by Dr. Jarrell    Myopic macular degeneration of both eyes   - FU Dr Weber  - CNV OU, CTM, May need anti VEGF soon     NS cataract OS    Plan:   I explained my findings. POAG, IOP bdl OS    Eye medications:   Both eyes: Continue Cosopt bid OD, start OS  Advised to close the eye for 2 min after instillation/ emphasized the importance of compliance     Return in 1 mo, IOP check

## 2025-01-14 NOTE — TELEPHONE ENCOUNTER
Sole Moreno phoned, [phone]. She has an appointment with you on February 3rd at 9:00. She is requesting the following medication:    lisinopriL-hydrochlorothiazide 20-12.5 mg tablet [[id number]]    Order Details  Dose: 2 tablet Route: oral Frequency: Daily   Dispense Quantity: 180 tablet Refills: 0          Sig: TAKE 2 TABLETS BY MOUTH EVERY DAY         Start Date: 01/08/25 End Date: --   Written Date: 01/08/25 Rx Expiration Date: 01/08/26        Associated Diagnoses: Benign essential HTN [I10]   Original Order: lisinopriL-hydrochlorothiazide 20-12.5 mg tablet [[id number]]   Providers    Ordering and Authorizing Provider: QUIN Castillo NPI: [id number]   JOHN #: [id number]   Ordering User: QUIN Castillo        Pharmacy    [facility]/pharmacy #[facility] [address] AT [place] [id number]  Phone: [phone]  Fax: [phone]

## 2025-01-15 ENCOUNTER — APPOINTMENT (OUTPATIENT)
Dept: OPHTHALMOLOGY | Facility: CLINIC | Age: 60
End: 2025-01-15
Payer: COMMERCIAL

## 2025-01-15 DIAGNOSIS — H40.1131 PRIMARY OPEN ANGLE GLAUCOMA (POAG) OF BOTH EYES, MILD STAGE: Primary | ICD-10-CM

## 2025-01-15 DIAGNOSIS — H40.041 STEROID RESPONDERS TO GLAUCOMA OF RIGHT EYE: ICD-10-CM

## 2025-01-15 PROCEDURE — 92133 CPTRZD OPH DX IMG PST SGM ON: CPT | Performed by: OPHTHALMOLOGY

## 2025-01-15 PROCEDURE — 99214 OFFICE O/P EST MOD 30 MIN: CPT | Performed by: OPHTHALMOLOGY

## 2025-01-15 PROCEDURE — 92020 GONIOSCOPY: CPT | Performed by: OPHTHALMOLOGY

## 2025-01-15 RX ORDER — DORZOLAMIDE HYDROCHLORIDE AND TIMOLOL MALEATE 20; 5 MG/ML; MG/ML
1 SOLUTION/ DROPS OPHTHALMIC 2 TIMES DAILY
Qty: 10 ML | Refills: 6 | Status: SHIPPED | OUTPATIENT
Start: 2025-01-15

## 2025-01-15 RX ORDER — DORZOLAMIDE HYDROCHLORIDE AND TIMOLOL MALEATE 20; 5 MG/ML; MG/ML
1 SOLUTION/ DROPS OPHTHALMIC 2 TIMES DAILY
Qty: 10 ML | Refills: 6 | Status: SHIPPED | OUTPATIENT
Start: 2025-01-15 | End: 2025-01-15 | Stop reason: ALTCHOICE

## 2025-01-15 ASSESSMENT — GONIOSCOPY
OD_TEMPORAL: SS
OS_NASAL: SS
OD_SUPERIOR: SS
OD_NASAL: SS
OD_INFERIOR: SS
OS_TEMPORAL: SS
OS_INFERIOR: SS
OS_SUPERIOR: SS

## 2025-01-15 ASSESSMENT — VISUAL ACUITY
OD_PH_SC: 20/30
METHOD: SNELLEN - LINEAR
OS_SC: 20/40
OD_SC: 20/50
OS_PH_SC: 20/30

## 2025-01-15 ASSESSMENT — ENCOUNTER SYMPTOMS
ENDOCRINE NEGATIVE: 0
RESPIRATORY NEGATIVE: 0
MUSCULOSKELETAL NEGATIVE: 0
HEMATOLOGIC/LYMPHATIC NEGATIVE: 0
CONSTITUTIONAL NEGATIVE: 0
CARDIOVASCULAR NEGATIVE: 0
PSYCHIATRIC NEGATIVE: 0
EYES NEGATIVE: 0
NEUROLOGICAL NEGATIVE: 0
GASTROINTESTINAL NEGATIVE: 0
ALLERGIC/IMMUNOLOGIC NEGATIVE: 0

## 2025-01-15 ASSESSMENT — TONOMETRY
IOP_METHOD: GOLDMANN APPLANATION
OS_IOP_MMHG: 20
OD_IOP_MMHG: 15

## 2025-01-15 ASSESSMENT — CONF VISUAL FIELD
OD_NORMAL: 1
OS_NORMAL: 1
OD_INFERIOR_NASAL_RESTRICTION: 0
OS_SUPERIOR_NASAL_RESTRICTION: 0
OD_SUPERIOR_NASAL_RESTRICTION: 0
OS_INFERIOR_NASAL_RESTRICTION: 0
OD_SUPERIOR_TEMPORAL_RESTRICTION: 0
OS_SUPERIOR_TEMPORAL_RESTRICTION: 0
OS_INFERIOR_TEMPORAL_RESTRICTION: 0
OD_INFERIOR_TEMPORAL_RESTRICTION: 0

## 2025-01-15 ASSESSMENT — PACHYMETRY
OS_CT(UM): 535
OD_CT(UM): 536

## 2025-01-15 ASSESSMENT — SLIT LAMP EXAM - LIDS
COMMENTS: NORMAL
COMMENTS: NORMAL

## 2025-01-15 ASSESSMENT — CUP TO DISC RATIO
OD_RATIO: .5
OS_RATIO: .4

## 2025-01-15 ASSESSMENT — EXTERNAL EXAM - LEFT EYE: OS_EXAM: NORMAL

## 2025-01-15 ASSESSMENT — EXTERNAL EXAM - RIGHT EYE: OD_EXAM: NORMAL

## 2025-02-03 ENCOUNTER — APPOINTMENT (OUTPATIENT)
Dept: PRIMARY CARE | Facility: CLINIC | Age: 60
End: 2025-02-03
Payer: COMMERCIAL

## 2025-02-03 VITALS
BODY MASS INDEX: 41.97 KG/M2 | WEIGHT: 260 LBS | SYSTOLIC BLOOD PRESSURE: 109 MMHG | DIASTOLIC BLOOD PRESSURE: 66 MMHG | RESPIRATION RATE: 16 BRPM | HEART RATE: 60 BPM | TEMPERATURE: 97.3 F | OXYGEN SATURATION: 97 %

## 2025-02-03 DIAGNOSIS — E11.9 TYPE 2 DIABETES MELLITUS WITHOUT COMPLICATION, WITHOUT LONG-TERM CURRENT USE OF INSULIN (MULTI): ICD-10-CM

## 2025-02-03 DIAGNOSIS — M54.50 LOW BACK PAIN WITHOUT SCIATICA, UNSPECIFIED BACK PAIN LATERALITY, UNSPECIFIED CHRONICITY: ICD-10-CM

## 2025-02-03 DIAGNOSIS — E11.9 TYPE 2 DIABETES MELLITUS WITHOUT COMPLICATION, UNSPECIFIED WHETHER LONG TERM INSULIN USE (MULTI): Primary | ICD-10-CM

## 2025-02-03 PROCEDURE — 1036F TOBACCO NON-USER: CPT | Performed by: NURSE PRACTITIONER

## 2025-02-03 PROCEDURE — 3074F SYST BP LT 130 MM HG: CPT | Performed by: NURSE PRACTITIONER

## 2025-02-03 PROCEDURE — 99214 OFFICE O/P EST MOD 30 MIN: CPT | Performed by: NURSE PRACTITIONER

## 2025-02-03 PROCEDURE — 3078F DIAST BP <80 MM HG: CPT | Performed by: NURSE PRACTITIONER

## 2025-02-03 RX ORDER — CYCLOBENZAPRINE HCL 5 MG
5 TABLET ORAL 3 TIMES DAILY PRN
Qty: 30 TABLET | Refills: 1 | Status: CANCELLED | OUTPATIENT
Start: 2025-02-03

## 2025-02-03 RX ORDER — ALBUTEROL SULFATE 90 UG/1
2 INHALANT RESPIRATORY (INHALATION) EVERY 4 HOURS PRN
Qty: 8.5 G | Refills: 2 | Status: CANCELLED | OUTPATIENT
Start: 2025-02-03

## 2025-02-03 RX ORDER — VIT C/E/ZN/COPPR/LUTEIN/ZEAXAN 250MG-90MG
25 CAPSULE ORAL DAILY
COMMUNITY

## 2025-02-03 RX ORDER — METFORMIN HYDROCHLORIDE 500 MG/1
500 TABLET ORAL
Qty: 180 TABLET | Refills: 2 | Status: SHIPPED | OUTPATIENT
Start: 2025-02-03

## 2025-02-03 ASSESSMENT — ENCOUNTER SYMPTOMS
BRUISES/BLEEDS EASILY: 0
APPETITE CHANGE: 0
POLYDIPSIA: 0
ACTIVITY CHANGE: 0
APNEA: 0
ANAL BLEEDING: 0
BLOOD IN STOOL: 0
DIARRHEA: 0
WHEEZING: 0
SHORTNESS OF BREATH: 0
ABDOMINAL PAIN: 0
FATIGUE: 0
SLEEP DISTURBANCE: 0
STRIDOR: 0
COUGH: 0
VOMITING: 0
CHILLS: 0
CHOKING: 0
JOINT SWELLING: 0
FEVER: 0
DIAPHORESIS: 0
UNEXPECTED WEIGHT CHANGE: 0
ADENOPATHY: 0
CHEST TIGHTNESS: 0
DIZZINESS: 0
PALPITATIONS: 0
PHOTOPHOBIA: 0
WEAKNESS: 0
NERVOUS/ANXIOUS: 0
DYSPHORIC MOOD: 0
NECK PAIN: 0
NAUSEA: 0
POLYPHAGIA: 0
HEMATURIA: 0
HEADACHES: 0

## 2025-02-03 ASSESSMENT — PATIENT HEALTH QUESTIONNAIRE - PHQ9
SUM OF ALL RESPONSES TO PHQ9 QUESTIONS 1 AND 2: 0
1. LITTLE INTEREST OR PLEASURE IN DOING THINGS: NOT AT ALL
2. FEELING DOWN, DEPRESSED OR HOPELESS: NOT AT ALL

## 2025-02-03 NOTE — PROGRESS NOTES
Subjective   Patient ID: Sole Moreno is a 59 y.o. female who presents for Diabetes.    HPI  Patient in office for follow-up on hypertension (controlled); elevated lipids (stable, per last lab check), depression (controlled), asthma (use rescue inhaler </monthly), and situational anxiety (controlled). Hx of CHEN; well-controlled on CPAP. The patient would like to continue on it. Hx of muscle spasms, which respond well to Flerxeril as neede. The patient no longer takes Gabapentin. No other concerns.     A1C today: 6.6% (stable); AM fasting glucose levels at home (<130)  BP: controlled    Patient declines statin therapy. Allergic to Aspirin.     Sees ophthalmologist regularly.     Monitors her feet for lesions.     Review of Systems   Constitutional:  Negative for activity change, appetite change, chills, diaphoresis, fatigue, fever and unexpected weight change.   HENT:  Negative for nosebleeds.    Eyes:  Negative for photophobia and visual disturbance.   Respiratory:  Negative for apnea, cough, choking, chest tightness, shortness of breath, wheezing and stridor.    Cardiovascular:  Negative for chest pain, palpitations and leg swelling.   Gastrointestinal:  Negative for abdominal pain, anal bleeding, blood in stool, diarrhea, nausea and vomiting.   Endocrine: Negative for cold intolerance, heat intolerance, polydipsia, polyphagia and polyuria.   Genitourinary:  Negative for hematuria.   Musculoskeletal:  Negative for gait problem, joint swelling and neck pain.        Hx of back muscle spasms   Skin:  Negative for pallor.   Neurological:  Negative for dizziness, syncope, weakness and headaches.   Hematological:  Negative for adenopathy. Does not bruise/bleed easily.   Psychiatric/Behavioral:  Negative for dysphoric mood and sleep disturbance. The patient is not nervous/anxious.        Objective   /66   Pulse 60   Temp 36.3 °C (97.3 °F) (Temporal)   Resp 16   Wt 118 kg (260 lb)   SpO2 97%   BMI 41.97 kg/m²      Physical Exam  Constitutional:       Appearance: Normal appearance.   HENT:      Head: Normocephalic.   Eyes:      Conjunctiva/sclera: Conjunctivae normal.   Cardiovascular:      Rate and Rhythm: Normal rate and regular rhythm.      Pulses: Normal pulses.      Heart sounds: Normal heart sounds.   Pulmonary:      Effort: Pulmonary effort is normal.      Breath sounds: Normal breath sounds.   Musculoskeletal:         General: No tenderness.      Right lower leg: No edema.      Left lower leg: No edema.   Skin:     General: Skin is warm.      Coloration: Skin is not pale.      Findings: No bruising.   Neurological:      General: No focal deficit present.      Mental Status: She is alert.      Gait: Gait normal.   Psychiatric:         Mood and Affect: Mood normal.         Behavior: Behavior normal.         Thought Content: Thought content normal.         Judgment: Judgment normal.     Assessment/Plan     Exam findings reviewed with patient. Medications and labs reviewed. Risk and benefits of statin therapy reviewed. Patient verbalized understanding. Patient will keep monitoring blood pressures and blood sugars at home; she will call the office with outliers or abnormal trends. Follow up in 6 months for physical and fasting lab work or earlier as needed.    Benefits of healthy lifestyle reviewed: low carbohydrates/fat/sugar diet; use lean white instead of red meet; use several servings of fruit and vegetables daily; use whole grain flour instead of white flour products; cook at home frequently instead of eating out; exercise 30-90 minutes 5 x/week.

## 2025-02-07 DIAGNOSIS — M54.50 LOW BACK PAIN WITHOUT SCIATICA, UNSPECIFIED BACK PAIN LATERALITY, UNSPECIFIED CHRONICITY: ICD-10-CM

## 2025-02-07 RX ORDER — ALBUTEROL SULFATE 90 UG/1
2 INHALANT RESPIRATORY (INHALATION) EVERY 4 HOURS PRN
Qty: 8.5 G | Refills: 0 | Status: SHIPPED | OUTPATIENT
Start: 2025-02-07

## 2025-02-07 RX ORDER — CYCLOBENZAPRINE HCL 5 MG
5 TABLET ORAL 3 TIMES DAILY PRN
Qty: 30 TABLET | Refills: 0 | Status: SHIPPED | OUTPATIENT
Start: 2025-02-07

## 2025-02-07 RX ORDER — CYCLOBENZAPRINE HCL 5 MG
5 TABLET ORAL 3 TIMES DAILY PRN
Qty: 30 TABLET | Refills: 0 | OUTPATIENT
Start: 2025-02-07

## 2025-02-07 RX ORDER — ALBUTEROL SULFATE 90 UG/1
2 INHALANT RESPIRATORY (INHALATION) EVERY 4 HOURS PRN
Qty: 8.5 G | Refills: 0 | OUTPATIENT
Start: 2025-02-07

## 2025-02-11 ENCOUNTER — APPOINTMENT (OUTPATIENT)
Dept: OPHTHALMOLOGY | Facility: CLINIC | Age: 60
End: 2025-02-11
Payer: COMMERCIAL

## 2025-02-25 ENCOUNTER — TELEPHONE (OUTPATIENT)
Dept: PRIMARY CARE | Facility: CLINIC | Age: 60
End: 2025-02-25
Payer: COMMERCIAL

## 2025-02-25 NOTE — TELEPHONE ENCOUNTER
Patient called requesting mammogram order, be mailed to her. Mammogram order dated 11/01/24, mailed to patient.

## 2025-03-03 ENCOUNTER — APPOINTMENT (OUTPATIENT)
Dept: OPHTHALMOLOGY | Facility: CLINIC | Age: 60
End: 2025-03-03
Payer: COMMERCIAL

## 2025-03-03 DIAGNOSIS — H44.2C1: ICD-10-CM

## 2025-03-03 DIAGNOSIS — H35.053 CHOROIDAL RETINAL NEOVASCULARIZATION, BILATERAL: ICD-10-CM

## 2025-03-03 DIAGNOSIS — H40.1131 PRIMARY OPEN ANGLE GLAUCOMA (POAG) OF BOTH EYES, MILD STAGE: ICD-10-CM

## 2025-03-03 DIAGNOSIS — H35.30 MYOPIC MACULAR DEGENERATION OF BOTH EYES: Primary | ICD-10-CM

## 2025-03-03 PROCEDURE — 99213 OFFICE O/P EST LOW 20 MIN: CPT | Performed by: OPHTHALMOLOGY

## 2025-03-03 PROCEDURE — 92134 CPTRZ OPH DX IMG PST SGM RTA: CPT | Performed by: OPHTHALMOLOGY

## 2025-03-03 RX ORDER — DORZOLAMIDE HYDROCHLORIDE AND TIMOLOL MALEATE 20; 5 MG/ML; MG/ML
1 SOLUTION/ DROPS OPHTHALMIC 2 TIMES DAILY
Qty: 10 ML | Refills: 11 | Status: SHIPPED | OUTPATIENT
Start: 2025-03-03 | End: 2025-04-02

## 2025-03-03 ASSESSMENT — TONOMETRY
OS_IOP_MMHG: 22
OD_IOP_MMHG: 15
OS_IOP_MMHG: 18
IOP_METHOD: GOLDMANN APPLANATION
IOP_METHOD: TONOPEN
OD_IOP_MMHG: 20

## 2025-03-03 ASSESSMENT — EXTERNAL EXAM - RIGHT EYE: OD_EXAM: NORMAL

## 2025-03-03 ASSESSMENT — VISUAL ACUITY
OD_PH_SC: 20/40
OD_SC: 20/50
METHOD: SNELLEN - LINEAR
OS_CC: 20/40 CL

## 2025-03-03 ASSESSMENT — CONF VISUAL FIELD
OS_INFERIOR_NASAL_RESTRICTION: 0
OS_INFERIOR_TEMPORAL_RESTRICTION: 0
OD_NORMAL: 1
OD_INFERIOR_NASAL_RESTRICTION: 0
OD_SUPERIOR_NASAL_RESTRICTION: 0
OD_SUPERIOR_TEMPORAL_RESTRICTION: 0
OS_SUPERIOR_TEMPORAL_RESTRICTION: 0
OS_SUPERIOR_NASAL_RESTRICTION: 0
OS_NORMAL: 1
OD_INFERIOR_TEMPORAL_RESTRICTION: 0

## 2025-03-03 ASSESSMENT — SLIT LAMP EXAM - LIDS
COMMENTS: NORMAL
COMMENTS: NORMAL

## 2025-03-03 ASSESSMENT — CUP TO DISC RATIO
OS_RATIO: .4
OD_RATIO: .5

## 2025-03-03 ASSESSMENT — EXTERNAL EXAM - LEFT EYE: OS_EXAM: NORMAL

## 2025-03-03 ASSESSMENT — PACHYMETRY
OS_CT(UM): 535
OD_CT(UM): 536

## 2025-03-03 ASSESSMENT — ENCOUNTER SYMPTOMS: EYES NEGATIVE: 1

## 2025-03-03 NOTE — PROGRESS NOTES
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  -     OCT, Retina - OU - Both Eyes      DIAGNOSTIC PROCEDURE DONE    OCT DONE OD/OS            REASON FOR TEST: will help address and tailor  therapy by detecting subclinical CME SRF     Hi quality OCT  scans obtained  signal good    OCT OD - abnormal Foveal Contour, No Edema, IS/OS Junction Normal subretinal fluid (SRF) mild  OCT OS - abnormal Foveal Contour, No Edema, IS/OS Junction Normal subretinal fluid (SRF) mild    additional commnents:    Intraocular pressure (IOP)  persistently high see Dr Jaimes          Today she ha s good visual acuity (VA) Srf OD would watch she had subretinal fluid (SRF) in 4 2024 also right  No change    Fu 3 months  May need anti VEGF soon      She may get better visual acuity (VA)  w refraction/ will ask Dr CAMPBELL opinion        4m

## 2025-04-14 ENCOUNTER — TELEPHONE (OUTPATIENT)
Dept: PRIMARY CARE | Facility: CLINIC | Age: 60
End: 2025-04-14
Payer: COMMERCIAL

## 2025-04-14 DIAGNOSIS — I10 BENIGN ESSENTIAL HTN: ICD-10-CM

## 2025-04-14 RX ORDER — LISINOPRIL AND HYDROCHLOROTHIAZIDE 12.5; 2 MG/1; MG/1
2 TABLET ORAL DAILY
Qty: 180 TABLET | Refills: 1 | Status: SHIPPED | OUTPATIENT
Start: 2025-04-14

## 2025-04-14 NOTE — TELEPHONE ENCOUNTER
Patient called requesting refill for Lisinopril hydrochlorothiazide, [facility], [place]. Follow up 8/4/25.

## 2025-04-16 ENCOUNTER — APPOINTMENT (OUTPATIENT)
Dept: OPHTHALMOLOGY | Facility: CLINIC | Age: 60
End: 2025-04-16
Payer: COMMERCIAL

## 2025-04-29 ENCOUNTER — TELEPHONE (OUTPATIENT)
Dept: PRIMARY CARE | Facility: CLINIC | Age: 60
End: 2025-04-29
Payer: COMMERCIAL

## 2025-04-29 DIAGNOSIS — M54.50 LOW BACK PAIN WITHOUT SCIATICA, UNSPECIFIED BACK PAIN LATERALITY, UNSPECIFIED CHRONICITY: ICD-10-CM

## 2025-04-29 RX ORDER — CYCLOBENZAPRINE HCL 5 MG
5 TABLET ORAL 3 TIMES DAILY PRN
Qty: 30 TABLET | Refills: 0 | Status: SHIPPED | OUTPATIENT
Start: 2025-04-29

## 2025-04-29 NOTE — TELEPHONE ENCOUNTER
Sole Moreno phoned, [phone]. She is requesting a refill on the following medication:    cyclobenzaprine (Flexeril) 5 mg tablet [[id number]]    Order Details  Dose: 5 mg Route: oral Frequency: 3 times daily PRN for muscle spasms   Dispense Quantity: 30 tablet (10 day supply) Refills: 0    Duration: -- Dispense As Written: Yes          Sig: Take 1 tablet (5 mg) by mouth 3 times a day as needed for muscle spasms.         Start Date: 02/07/25 End Date: --   Written Date: 02/07/25 Rx Expiration Date: 02/07/26        Associated Diagnoses: Low back pain without sciatica, unspecified back pain laterality, unspecified chronicity [M54.50]   Original Order: cyclobenzaprine (Flexeril) 5 mg tablet [[id number]]   Providers    Ordering and Authorizing Provider: QUIN Castillo NPI: [id number]   JOHN #: [id number]   Authorizing Specialties: Family Medicine, Internal Medicine    Ordering User: QUIN Castillo        Pharmacy    [facility]/pharmacy #[id number] [address] AT [place] [address]  Phone: [phone]  Fax: [phone]

## 2025-05-20 ENCOUNTER — APPOINTMENT (OUTPATIENT)
Dept: OPHTHALMOLOGY | Facility: CLINIC | Age: 60
End: 2025-05-20
Payer: COMMERCIAL

## 2025-05-21 ENCOUNTER — APPOINTMENT (OUTPATIENT)
Dept: OPHTHALMOLOGY | Facility: CLINIC | Age: 60
End: 2025-05-21
Payer: COMMERCIAL

## 2025-07-07 ENCOUNTER — APPOINTMENT (OUTPATIENT)
Dept: OPHTHALMOLOGY | Facility: CLINIC | Age: 60
End: 2025-07-07
Payer: COMMERCIAL

## 2025-08-04 ENCOUNTER — APPOINTMENT (OUTPATIENT)
Dept: PRIMARY CARE | Facility: CLINIC | Age: 60
End: 2025-08-04
Payer: COMMERCIAL

## 2025-08-16 ENCOUNTER — APPOINTMENT (OUTPATIENT)
Dept: OPHTHALMOLOGY | Facility: CLINIC | Age: 60
End: 2025-08-16
Payer: COMMERCIAL

## 2025-08-27 DIAGNOSIS — E11.9 TYPE 2 DIABETES MELLITUS WITHOUT COMPLICATION, UNSPECIFIED WHETHER LONG TERM INSULIN USE: ICD-10-CM

## 2025-08-28 RX ORDER — BLOOD SUGAR DIAGNOSTIC
STRIP MISCELLANEOUS
Qty: 100 STRIP | Refills: 3 | Status: SHIPPED | OUTPATIENT
Start: 2025-08-28

## 2025-10-20 ENCOUNTER — APPOINTMENT (OUTPATIENT)
Dept: OPHTHALMOLOGY | Facility: CLINIC | Age: 60
End: 2025-10-20
Payer: COMMERCIAL

## 2026-01-07 ENCOUNTER — APPOINTMENT (OUTPATIENT)
Dept: OPHTHALMOLOGY | Facility: CLINIC | Age: 61
End: 2026-01-07
Payer: COMMERCIAL

## (undated) DEVICE — GLOVE, SURGICAL, PROTEXIS PI , 8.0, PF, LF

## (undated) DEVICE — Device

## (undated) DEVICE — HANDPIECE,  IRRIGATION/ASPIRATION, 45DEG, 2.2MM-2.8MM, BLUE

## (undated) DEVICE — NEEDLE, FILTER 19 G X 1 IN

## (undated) DEVICE — DRAPE, UTILITY, INSTRUMENT PANEL, 46CM X 56CM (18X22"

## (undated) DEVICE — CANNULA, ANTERIOR CHAMBER, 27 GA

## (undated) DEVICE — GOWN, ASTOUND, XL

## (undated) DEVICE — NEEDLE, HYDRODISSECTION 25G 11MM BEND

## (undated) DEVICE — SYRINGE, 10 CC, LUER LOCK